# Patient Record
Sex: MALE | Race: WHITE | Employment: FULL TIME | URBAN - METROPOLITAN AREA
[De-identification: names, ages, dates, MRNs, and addresses within clinical notes are randomized per-mention and may not be internally consistent; named-entity substitution may affect disease eponyms.]

---

## 2017-02-24 PROBLEM — E10.65 CONTROLLED TYPE 1 DIABETES MELLITUS WITH HYPERGLYCEMIA (HCC): Status: ACTIVE | Noted: 2017-02-24

## 2018-02-03 ENCOUNTER — HOSPITAL ENCOUNTER (OUTPATIENT)
Facility: HOSPITAL | Age: 43
Setting detail: OBSERVATION
Discharge: HOME | End: 2018-02-04
Attending: EMERGENCY MEDICINE | Admitting: INTERNAL MEDICINE
Payer: COMMERCIAL

## 2018-02-03 ENCOUNTER — APPOINTMENT (OUTPATIENT)
Dept: CT IMAGING | Facility: HOSPITAL | Age: 43
End: 2018-02-03
Attending: EMERGENCY MEDICINE
Payer: COMMERCIAL

## 2018-02-03 DIAGNOSIS — N20.0 RENAL STONE: Primary | ICD-10-CM

## 2018-02-03 LAB
BILIRUB UR QL STRIP.AUTO: NEGATIVE
COLOR UR AUTO: YELLOW
GLUCOSE UR STRIP.AUTO-MCNC: 150 MG/DL
KETONES UR STRIP.AUTO-MCNC: NEGATIVE MG/DL
LEUKOCYTE ESTERASE UR QL STRIP.AUTO: NEGATIVE
NITRITE UR QL STRIP.AUTO: NEGATIVE
PH UR STRIP.AUTO: 6 [PH] (ref 4.5–8)
PROT UR STRIP.AUTO-MCNC: NEGATIVE MG/DL
RBC #/AREA URNS AUTO: >10 /HPF
SP GR UR STRIP.AUTO: 1.02 (ref 1–1.03)
UROBILINOGEN UR STRIP.AUTO-MCNC: <2 MG/DL

## 2018-02-03 PROCEDURE — 74176 CT ABD & PELVIS W/O CONTRAST: CPT | Performed by: EMERGENCY MEDICINE

## 2018-02-03 RX ORDER — MORPHINE SULFATE 2 MG/ML
4 INJECTION, SOLUTION INTRAMUSCULAR; INTRAVENOUS ONCE
Status: COMPLETED | OUTPATIENT
Start: 2018-02-03 | End: 2018-02-03

## 2018-02-03 RX ORDER — ONDANSETRON 2 MG/ML
4 INJECTION INTRAMUSCULAR; INTRAVENOUS ONCE
Status: COMPLETED | OUTPATIENT
Start: 2018-02-03 | End: 2018-02-03

## 2018-02-03 RX ORDER — MORPHINE SULFATE 2 MG/ML
4 INJECTION, SOLUTION INTRAMUSCULAR; INTRAVENOUS EVERY 30 MIN PRN
Status: DISCONTINUED | OUTPATIENT
Start: 2018-02-03 | End: 2018-02-04

## 2018-02-04 ENCOUNTER — SURGERY (OUTPATIENT)
Age: 43
End: 2018-02-04

## 2018-02-04 ENCOUNTER — APPOINTMENT (OUTPATIENT)
Dept: GENERAL RADIOLOGY | Facility: HOSPITAL | Age: 43
End: 2018-02-04
Attending: UROLOGY
Payer: COMMERCIAL

## 2018-02-04 VITALS
TEMPERATURE: 98 F | WEIGHT: 226.69 LBS | HEIGHT: 71 IN | SYSTOLIC BLOOD PRESSURE: 143 MMHG | BODY MASS INDEX: 31.73 KG/M2 | OXYGEN SATURATION: 96 % | RESPIRATION RATE: 20 BRPM | DIASTOLIC BLOOD PRESSURE: 74 MMHG | HEART RATE: 87 BPM

## 2018-02-04 PROBLEM — N20.0 RENAL STONE: Status: ACTIVE | Noted: 2018-02-04

## 2018-02-04 LAB
ALBUMIN SERPL-MCNC: 3.9 G/DL (ref 3.5–4.8)
ALP LIVER SERPL-CCNC: 57 U/L (ref 45–117)
ALT SERPL-CCNC: 29 U/L (ref 17–63)
AST SERPL-CCNC: 13 U/L (ref 15–41)
BASOPHILS # BLD AUTO: 0.01 X10(3) UL (ref 0–0.1)
BASOPHILS # BLD AUTO: 0.02 X10(3) UL (ref 0–0.1)
BASOPHILS NFR BLD AUTO: 0.1 %
BASOPHILS NFR BLD AUTO: 0.3 %
BILIRUB SERPL-MCNC: 0.7 MG/DL (ref 0.1–2)
BILIRUB UR QL STRIP.AUTO: NEGATIVE
BUN BLD-MCNC: 19 MG/DL (ref 8–20)
BUN BLD-MCNC: 21 MG/DL (ref 8–20)
CALCIUM BLD-MCNC: 8.5 MG/DL (ref 8.3–10.3)
CALCIUM BLD-MCNC: 8.9 MG/DL (ref 8.3–10.3)
CHLORIDE: 105 MMOL/L (ref 101–111)
CHLORIDE: 108 MMOL/L (ref 101–111)
CLARITY UR REFRACT.AUTO: CLEAR
CO2: 25 MMOL/L (ref 22–32)
CO2: 27 MMOL/L (ref 22–32)
COLOR UR AUTO: YELLOW
CREAT BLD-MCNC: 1.36 MG/DL (ref 0.7–1.3)
CREAT BLD-MCNC: 1.44 MG/DL (ref 0.7–1.3)
EOSINOPHIL # BLD AUTO: 0.01 X10(3) UL (ref 0–0.3)
EOSINOPHIL # BLD AUTO: 0.05 X10(3) UL (ref 0–0.3)
EOSINOPHIL NFR BLD AUTO: 0.1 %
EOSINOPHIL NFR BLD AUTO: 0.6 %
ERYTHROCYTE [DISTWIDTH] IN BLOOD BY AUTOMATED COUNT: 12.6 % (ref 11.5–16)
ERYTHROCYTE [DISTWIDTH] IN BLOOD BY AUTOMATED COUNT: 12.8 % (ref 11.5–16)
EST. AVERAGE GLUCOSE BLD GHB EST-MCNC: 223 MG/DL (ref 68–126)
GLUCOSE BLD-MCNC: 124 MG/DL (ref 65–99)
GLUCOSE BLD-MCNC: 133 MG/DL (ref 70–99)
GLUCOSE BLD-MCNC: 135 MG/DL (ref 65–99)
GLUCOSE BLD-MCNC: 147 MG/DL (ref 65–99)
GLUCOSE BLD-MCNC: 173 MG/DL (ref 70–99)
GLUCOSE UR STRIP.AUTO-MCNC: 150 MG/DL
HBA1C MFR BLD HPLC: 9.4 % (ref ?–5.7)
HCT VFR BLD AUTO: 36.1 % (ref 37–53)
HCT VFR BLD AUTO: 38.5 % (ref 37–53)
HGB BLD-MCNC: 12.3 G/DL (ref 13–17)
HGB BLD-MCNC: 13 G/DL (ref 13–17)
IMMATURE GRANULOCYTE COUNT: 0.02 X10(3) UL (ref 0–1)
IMMATURE GRANULOCYTE COUNT: 0.03 X10(3) UL (ref 0–1)
IMMATURE GRANULOCYTE RATIO %: 0.3 %
IMMATURE GRANULOCYTE RATIO %: 0.3 %
KETONES UR STRIP.AUTO-MCNC: NEGATIVE MG/DL
LEUKOCYTE ESTERASE UR QL STRIP.AUTO: NEGATIVE
LIPASE: 62 U/L (ref 73–393)
LYMPHOCYTES # BLD AUTO: 0.86 X10(3) UL (ref 0.9–4)
LYMPHOCYTES # BLD AUTO: 1.28 X10(3) UL (ref 0.9–4)
LYMPHOCYTES NFR BLD AUTO: 12.2 %
LYMPHOCYTES NFR BLD AUTO: 14.1 %
M PROTEIN MFR SERPL ELPH: 7.2 G/DL (ref 6.1–8.3)
MCH RBC QN AUTO: 29 PG (ref 27–33.2)
MCH RBC QN AUTO: 29.5 PG (ref 27–33.2)
MCHC RBC AUTO-ENTMCNC: 33.8 G/DL (ref 31–37)
MCHC RBC AUTO-ENTMCNC: 34.1 G/DL (ref 31–37)
MCV RBC AUTO: 85.9 FL (ref 80–99)
MCV RBC AUTO: 86.6 FL (ref 80–99)
MONOCYTES # BLD AUTO: 0.67 X10(3) UL (ref 0.1–0.6)
MONOCYTES # BLD AUTO: 1.08 X10(3) UL (ref 0.1–0.6)
MONOCYTES NFR BLD AUTO: 11.9 %
MONOCYTES NFR BLD AUTO: 9.5 %
NEUTROPHIL ABS PRELIM: 5.47 X10 (3) UL (ref 1.3–6.7)
NEUTROPHIL ABS PRELIM: 6.6 X10 (3) UL (ref 1.3–6.7)
NEUTROPHILS # BLD AUTO: 5.47 X10(3) UL (ref 1.3–6.7)
NEUTROPHILS # BLD AUTO: 6.6 X10(3) UL (ref 1.3–6.7)
NEUTROPHILS NFR BLD AUTO: 73 %
NEUTROPHILS NFR BLD AUTO: 77.6 %
NITRITE UR QL STRIP.AUTO: NEGATIVE
PH UR STRIP.AUTO: 6 [PH] (ref 4.5–8)
PLATELET # BLD AUTO: 167 10(3)UL (ref 150–450)
PLATELET # BLD AUTO: 211 10(3)UL (ref 150–450)
POTASSIUM SERPL-SCNC: 3.8 MMOL/L (ref 3.6–5.1)
POTASSIUM SERPL-SCNC: 3.8 MMOL/L (ref 3.6–5.1)
PROT UR STRIP.AUTO-MCNC: NEGATIVE MG/DL
RBC # BLD AUTO: 4.17 X10(6)UL (ref 4.3–5.7)
RBC # BLD AUTO: 4.48 X10(6)UL (ref 4.3–5.7)
RBC #/AREA URNS AUTO: >10 /HPF
RED CELL DISTRIBUTION WIDTH-SD: 39.7 FL (ref 35.1–46.3)
RED CELL DISTRIBUTION WIDTH-SD: 40.1 FL (ref 35.1–46.3)
SODIUM SERPL-SCNC: 139 MMOL/L (ref 136–144)
SODIUM SERPL-SCNC: 140 MMOL/L (ref 136–144)
SP GR UR STRIP.AUTO: 1.02 (ref 1–1.03)
UROBILINOGEN UR STRIP.AUTO-MCNC: <2 MG/DL
WBC # BLD AUTO: 7.1 X10(3) UL (ref 4–13)
WBC # BLD AUTO: 9.1 X10(3) UL (ref 4–13)

## 2018-02-04 PROCEDURE — 74420 UROGRAPHY RTRGR +-KUB: CPT | Performed by: UROLOGY

## 2018-02-04 PROCEDURE — 99219 INITIAL OBSERVATION CARE,LEVL II: CPT | Performed by: INTERNAL MEDICINE

## 2018-02-04 PROCEDURE — 0T768DZ DILATION OF RIGHT URETER WITH INTRALUMINAL DEVICE, VIA NATURAL OR ARTIFICIAL OPENING ENDOSCOPIC: ICD-10-PCS | Performed by: UROLOGY

## 2018-02-04 PROCEDURE — 0TF68ZZ FRAGMENTATION IN RIGHT URETER, VIA NATURAL OR ARTIFICIAL OPENING ENDOSCOPIC: ICD-10-PCS | Performed by: UROLOGY

## 2018-02-04 DEVICE — INLAY STENT 6F 28CM: Type: IMPLANTABLE DEVICE | Site: URETER | Status: FUNCTIONAL

## 2018-02-04 RX ORDER — DEXTROSE MONOHYDRATE 25 G/50ML
50 INJECTION, SOLUTION INTRAVENOUS
Status: DISCONTINUED | OUTPATIENT
Start: 2018-02-04 | End: 2018-02-04 | Stop reason: HOSPADM

## 2018-02-04 RX ORDER — PHENAZOPYRIDINE HYDROCHLORIDE 200 MG/1
200 TABLET, FILM COATED ORAL
Status: DISCONTINUED | OUTPATIENT
Start: 2018-02-04 | End: 2018-02-04

## 2018-02-04 RX ORDER — NALOXONE HYDROCHLORIDE 0.4 MG/ML
80 INJECTION, SOLUTION INTRAMUSCULAR; INTRAVENOUS; SUBCUTANEOUS AS NEEDED
Status: DISCONTINUED | OUTPATIENT
Start: 2018-02-04 | End: 2018-02-04 | Stop reason: HOSPADM

## 2018-02-04 RX ORDER — ONDANSETRON 2 MG/ML
4 INJECTION INTRAMUSCULAR; INTRAVENOUS ONCE
Status: COMPLETED | OUTPATIENT
Start: 2018-02-04 | End: 2018-02-04

## 2018-02-04 RX ORDER — HYDROCODONE BITARTRATE AND ACETAMINOPHEN 5; 325 MG/1; MG/1
1 TABLET ORAL AS NEEDED
Status: DISCONTINUED | OUTPATIENT
Start: 2018-02-04 | End: 2018-02-04 | Stop reason: HOSPADM

## 2018-02-04 RX ORDER — ROSUVASTATIN CALCIUM 20 MG/1
10 TABLET, COATED ORAL NIGHTLY
Status: DISCONTINUED | OUTPATIENT
Start: 2018-02-04 | End: 2018-02-04

## 2018-02-04 RX ORDER — FENOFIBRATE 134 MG/1
134 CAPSULE ORAL
Status: DISCONTINUED | OUTPATIENT
Start: 2018-02-04 | End: 2018-02-04

## 2018-02-04 RX ORDER — KETOROLAC TROMETHAMINE 30 MG/ML
30 INJECTION, SOLUTION INTRAMUSCULAR; INTRAVENOUS ONCE
Status: COMPLETED | OUTPATIENT
Start: 2018-02-04 | End: 2018-02-04

## 2018-02-04 RX ORDER — MORPHINE SULFATE 2 MG/ML
4 INJECTION, SOLUTION INTRAMUSCULAR; INTRAVENOUS EVERY 2 HOUR PRN
Status: DISCONTINUED | OUTPATIENT
Start: 2018-02-04 | End: 2018-02-04

## 2018-02-04 RX ORDER — ACETAMINOPHEN 500 MG
1000 TABLET ORAL ONCE AS NEEDED
Status: DISCONTINUED | OUTPATIENT
Start: 2018-02-04 | End: 2018-02-04 | Stop reason: HOSPADM

## 2018-02-04 RX ORDER — ONDANSETRON 2 MG/ML
4 INJECTION INTRAMUSCULAR; INTRAVENOUS AS NEEDED
Status: DISCONTINUED | OUTPATIENT
Start: 2018-02-04 | End: 2018-02-04 | Stop reason: HOSPADM

## 2018-02-04 RX ORDER — SODIUM CHLORIDE 9 MG/ML
INJECTION, SOLUTION INTRAVENOUS CONTINUOUS
Status: ACTIVE | OUTPATIENT
Start: 2018-02-04 | End: 2018-02-04

## 2018-02-04 RX ORDER — ACETAMINOPHEN 325 MG/1
650 TABLET ORAL EVERY 6 HOURS PRN
Status: DISCONTINUED | OUTPATIENT
Start: 2018-02-04 | End: 2018-02-04

## 2018-02-04 RX ORDER — HEPARIN SODIUM 5000 [USP'U]/ML
5000 INJECTION, SOLUTION INTRAVENOUS; SUBCUTANEOUS EVERY 8 HOURS SCHEDULED
Status: DISCONTINUED | OUTPATIENT
Start: 2018-02-04 | End: 2018-02-04

## 2018-02-04 RX ORDER — PHENAZOPYRIDINE HYDROCHLORIDE 100 MG/1
100 TABLET, FILM COATED ORAL 3 TIMES DAILY PRN
Qty: 15 TABLET | Refills: 1 | Status: SHIPPED | OUTPATIENT
Start: 2018-02-04 | End: 2018-02-14

## 2018-02-04 RX ORDER — DEXTROSE MONOHYDRATE 25 G/50ML
50 INJECTION, SOLUTION INTRAVENOUS
Status: DISCONTINUED | OUTPATIENT
Start: 2018-02-04 | End: 2018-02-04

## 2018-02-04 RX ORDER — KETOROLAC TROMETHAMINE 30 MG/ML
INJECTION, SOLUTION INTRAMUSCULAR; INTRAVENOUS
Status: DISPENSED
Start: 2018-02-04 | End: 2018-02-04

## 2018-02-04 RX ORDER — HYDROCODONE BITARTRATE AND ACETAMINOPHEN 5; 325 MG/1; MG/1
2 TABLET ORAL AS NEEDED
Status: DISCONTINUED | OUTPATIENT
Start: 2018-02-04 | End: 2018-02-04 | Stop reason: HOSPADM

## 2018-02-04 RX ORDER — OXYBUTYNIN CHLORIDE 10 MG/1
10 TABLET, EXTENDED RELEASE ORAL DAILY PRN
Qty: 21 TABLET | Refills: 0 | Status: SHIPPED | OUTPATIENT
Start: 2018-02-04 | End: 2018-02-23

## 2018-02-04 RX ORDER — MEPERIDINE HYDROCHLORIDE 25 MG/ML
INJECTION INTRAMUSCULAR; INTRAVENOUS; SUBCUTANEOUS
Status: COMPLETED
Start: 2018-02-04 | End: 2018-02-04

## 2018-02-04 RX ORDER — MORPHINE SULFATE 2 MG/ML
1 INJECTION, SOLUTION INTRAMUSCULAR; INTRAVENOUS EVERY 2 HOUR PRN
Status: DISCONTINUED | OUTPATIENT
Start: 2018-02-04 | End: 2018-02-04

## 2018-02-04 RX ORDER — SODIUM CHLORIDE, SODIUM LACTATE, POTASSIUM CHLORIDE, CALCIUM CHLORIDE 600; 310; 30; 20 MG/100ML; MG/100ML; MG/100ML; MG/100ML
INJECTION, SOLUTION INTRAVENOUS CONTINUOUS
Status: DISCONTINUED | OUTPATIENT
Start: 2018-02-04 | End: 2018-02-04 | Stop reason: HOSPADM

## 2018-02-04 RX ORDER — SODIUM CHLORIDE 9 MG/ML
INJECTION, SOLUTION INTRAVENOUS CONTINUOUS
Status: DISCONTINUED | OUTPATIENT
Start: 2018-02-04 | End: 2018-02-04

## 2018-02-04 RX ORDER — MEPERIDINE HYDROCHLORIDE 25 MG/ML
12.5 INJECTION INTRAMUSCULAR; INTRAVENOUS; SUBCUTANEOUS ONCE
Status: COMPLETED | OUTPATIENT
Start: 2018-02-04 | End: 2018-02-04

## 2018-02-04 RX ORDER — HYDROCODONE BITARTRATE AND ACETAMINOPHEN 5; 325 MG/1; MG/1
1 TABLET ORAL EVERY 4 HOURS PRN
Qty: 25 TABLET | Refills: 0 | Status: SHIPPED | OUTPATIENT
Start: 2018-02-04 | End: 2018-02-14

## 2018-02-04 RX ORDER — MORPHINE SULFATE 2 MG/ML
2 INJECTION, SOLUTION INTRAMUSCULAR; INTRAVENOUS EVERY 5 MIN PRN
Status: DISCONTINUED | OUTPATIENT
Start: 2018-02-04 | End: 2018-02-04 | Stop reason: HOSPADM

## 2018-02-04 RX ORDER — ONDANSETRON 2 MG/ML
4 INJECTION INTRAMUSCULAR; INTRAVENOUS EVERY 6 HOURS PRN
Status: DISCONTINUED | OUTPATIENT
Start: 2018-02-04 | End: 2018-02-04

## 2018-02-04 RX ORDER — CEFAZOLIN SODIUM 1 G/3ML
INJECTION, POWDER, FOR SOLUTION INTRAMUSCULAR; INTRAVENOUS
Status: DISCONTINUED | OUTPATIENT
Start: 2018-02-04 | End: 2018-02-04 | Stop reason: HOSPADM

## 2018-02-04 RX ORDER — HYDROCODONE BITARTRATE AND ACETAMINOPHEN 5; 325 MG/1; MG/1
1 TABLET ORAL EVERY 6 HOURS PRN
Status: DISCONTINUED | OUTPATIENT
Start: 2018-02-04 | End: 2018-02-04

## 2018-02-04 RX ORDER — ALFUZOSIN HYDROCHLORIDE 10 MG/1
10 TABLET, EXTENDED RELEASE ORAL
Status: DISCONTINUED | OUTPATIENT
Start: 2018-02-04 | End: 2018-02-04

## 2018-02-04 RX ORDER — ONDANSETRON 2 MG/ML
INJECTION INTRAMUSCULAR; INTRAVENOUS
Status: DISPENSED
Start: 2018-02-04 | End: 2018-02-04

## 2018-02-04 RX ORDER — MORPHINE SULFATE 2 MG/ML
2 INJECTION, SOLUTION INTRAMUSCULAR; INTRAVENOUS EVERY 2 HOUR PRN
Status: DISCONTINUED | OUTPATIENT
Start: 2018-02-04 | End: 2018-02-04

## 2018-02-04 NOTE — PLAN OF CARE
Diabetes/Glucose Control    • Glucose maintained within prescribed range Progressing        Pt. On Insulin drip as at home. Dr. Neymar Sparrow in to see pt this am for Insulin pump management. Contract reviewed w/ pt and signed.      GENITOURINARY - ADULT    • Abse

## 2018-02-04 NOTE — RESPIRATORY THERAPY NOTE
MELVI : EQUIPMENT USE: DAILY SUMMARY                                            SET MODE: AUTO CPAP WITH CFLEX                                          USAGE IN HOURS: 5:38                                          90

## 2018-02-04 NOTE — CONSULTS
[facility] Urology  Consultation Note    Velasquez Molina Patient Status:  Observation    1975 MRN [id number]   [facility] 4NW-A Attending Lavern Jovan [address] Day # 0 PCP [address]     Reason for Consultation:  R Problem Relation Age of Onset   • Cancer Mother      polpys before age 27   • Cancer Maternal Grandfather      colon cancer   • Cancer Paternal Grandfather      colon cancer   • Diabetes Father      Type 2 DM      reports that he quit smoking about 18 ye organomegally, no tenderness, normal bowel sounds  NEURO: Alert and Oriented, motor and sensory grossly non-focal   SKIN: No rashes, no discoloration  EXTREMITIES: No edema or cyanosis, no calf pain    Laboratory Data:    Lab Results  Component Value Date

## 2018-02-04 NOTE — OPERATIVE REPORT
[address] [facility] Patient Status:  Observation    1975 MRN [id number]   [facility] SURGERY Attending [facility] Day # 0 PCP Silvia Genaouth: 2018    SURG manner. There was dilation of ureter and kidney calyces observed proximal to the right ureter filling defect, most likely signifying the stone.   A 0.038 guide wire was then advanced via the open end catheter into the ureter and up to the renal collecting

## 2018-02-04 NOTE — CONSULTS
[facility]  Report of Consultation    Katelin Vidales Patient Status:  Observation    1975 MRN [id number]   [facility] 4NW-A Attending Kalyn Murphy [address] Day # 0 PCP [address]     Reason for Consultation:  Type 1 di complication, uncontrolled     Dx at age 40 in 2/2013   • Visual impairment     Blurry vision     Past Surgical History:  2002: COLONOSCOPY      Comment: negative  No date: OTHER SURGICAL HISTORY      Comment: DST repair  3/6/14: OTHER SURGICAL HISTORY Exam:  Wt Readings from Last 6 Encounters:  02/04/18 : 226 lb 11.2 oz (102.8 kg)  06/26/17 : 244 lb 3.2 oz (110.8 kg)  02/24/17 : 243 lb 8 oz (110.5 kg)  11/18/16 : 242 lb 11.2 oz (110.1 kg)  07/13/16 : 242 lb 8 oz (110 kg)  03/03/16 : 238 lb 8 oz (108.2 k 43year old male here for      1. Type 1 diabetes, uncontrolled with hyperglycemia.   Insulin pump in place.   - will use insulin pump during hospitalization  - risk for hyperglycemia due to stress   - risk for hypoglycemia while NPO  - adjusted to temp b

## 2018-02-04 NOTE — PLAN OF CARE
NURSING ADMISSION NOTE      Patient admitted via cart. Oriented to room. Safety precautions initiated. Bed in low position. Call light in reach. Admitted patient from ED due to right flank pain/ kidney stone.  Received admitting orders from Dr. Judy Freed

## 2018-02-04 NOTE — H&P
[facility]  History and Physical     Diogo Bauer Patient Status:  Emergency    1975 MRN [id number]   Location [address] Attending Will, Lenoard Leyden, MD   Hosp Day # 0 PCP [address]     Chief Complaint: Alize Chin Paternal Grandfather      colon cancer   • Diabetes Father      Type 2 DM       Allergies:   Mold                    Runny nose  Pollen                  Runny nose    Medications:    No current facility-administered medications on file prior to encounter. 4 times per day before meals and bedtime Disp:  Rfl:    Blood Glucose Monitoring Suppl (ACCU-CHEK NICOLE SMARTVIEW) W/DEVICE Does not apply Kit  Disp: 1 kit Rfl: 0   Multiple Vitamins-Minerals (MULTIVITAL-M OR) Take 1 tablet by mouth daily.  Disp:  Rfl:    Bl input(s): TROP, CK in the last 72 hours. Imaging: Imaging data reviewed in Epic. ASSESSMENT / PLAN:     1. Renal Stone/Colic  1. Continue IVF  2. Pain control  3. Urology to eval in AM  4. NPO  5. Uroxatral  6. Check UA reflex culture  2.  Acute Colin

## 2018-02-04 NOTE — ED PROVIDER NOTES
Patient Seen in: [facility] Emergency Department    History   Patient presents with:  Abdomen/Flank Pain (GI/)    Stated Complaint: rt flank pain hx of stones    HPI    Patient is a 70-year-old male multiple past medical problems listed below, inc date: 12/1/1999  Smokeless tobacco: Never Used                      Alcohol use: Yes              Comment: 2 wine and 1 beer a week      Review of Systems    Positive for stated complaint: rt flank pain hx of stones  Other systems are as noted in HPI.   Con (*)     RBC URINE >10 (*)     Mucous Urine 1+ (*)     All other components within normal limits   COMP METABOLIC PANEL (14) - Abnormal; Notable for the following:     Glucose 173 (*)     BUN 21 (*)     Creatinine 1.36 (*)     AST 13 (*)     All other compo techniques were used. Dose information is transmitted to the  [facility]) NRDR ([address]) which includes the Dose Index Registry.   PATIENT STATED HISTORY: (As transcribed by Technologist)  Patient has right fl will need to be admitted for pain control and urology consultation. I discussed the patient with Dr. Brian Glynn and Dr. Ernesto Samuel of urology, and he was admitted in a stable condition.       Admission disposition: 2/4/2018 12:40 AM           Disposition a

## 2018-02-28 RX ORDER — OXYBUTYNIN CHLORIDE 10 MG/1
10 TABLET, EXTENDED RELEASE ORAL DAILY PRN
Qty: 7 TABLET | Refills: 0 | Status: SHIPPED | OUTPATIENT
Start: 2018-02-28 | End: 2018-03-07

## 2018-02-28 NOTE — TELEPHONE ENCOUNTER
From: Georgiacheko Maria  Sent: 2/23/2018 1:11 PM CST  Subject: Medication Renewal Request    Hernandez Sun would like a refill of the following medications:     Oxybutynin Chloride ER (DITROPAN XL) 10 MG Oral Tablet 24 Hr [Igor Hernandez MD]   Patient Comment:

## 2018-03-06 PROBLEM — E78.5 HYPERLIPIDEMIA LDL GOAL <100: Status: ACTIVE | Noted: 2018-03-06

## 2018-03-06 PROBLEM — IMO0001 UNCONTROLLED TYPE 1 DIABETES MELLITUS WITHOUT COMPLICATION: Status: ACTIVE | Noted: 2018-03-06

## 2018-03-06 PROCEDURE — 82043 UR ALBUMIN QUANTITATIVE: CPT | Performed by: INTERNAL MEDICINE

## 2018-03-06 PROCEDURE — 82570 ASSAY OF URINE CREATININE: CPT | Performed by: INTERNAL MEDICINE

## 2018-04-09 PROCEDURE — 82507 ASSAY OF CITRATE: CPT | Performed by: UROLOGY

## 2018-04-09 PROCEDURE — 84392 ASSAY OF URINE SULFATE: CPT | Performed by: UROLOGY

## 2018-04-09 PROCEDURE — 83945 ASSAY OF OXALATE: CPT | Performed by: UROLOGY

## 2018-04-09 PROCEDURE — 82340 ASSAY OF CALCIUM IN URINE: CPT | Performed by: UROLOGY

## 2018-04-09 PROCEDURE — 82436 ASSAY OF URINE CHLORIDE: CPT | Performed by: UROLOGY

## 2018-06-04 PROBLEM — E10.65 UNCONTROLLED TYPE 1 DIABETES MELLITUS WITH HYPERGLYCEMIA (HCC): Status: ACTIVE | Noted: 2018-03-06

## 2018-06-04 PROBLEM — IMO0001 UNCONTROLLED TYPE 1 DIABETES MELLITUS WITHOUT COMPLICATION: Status: RESOLVED | Noted: 2018-03-06 | Resolved: 2018-06-04

## 2018-06-04 PROBLEM — E10.65 CONTROLLED TYPE 1 DIABETES MELLITUS WITH HYPERGLYCEMIA (HCC): Status: RESOLVED | Noted: 2017-02-24 | Resolved: 2018-06-04

## 2019-03-06 ENCOUNTER — HOSPITAL ENCOUNTER (EMERGENCY)
Facility: HOSPITAL | Age: 44
Discharge: HOME | End: 2019-03-06
Attending: EMERGENCY MEDICINE
Payer: COMMERCIAL

## 2019-03-06 ENCOUNTER — APPOINTMENT (OUTPATIENT)
Dept: GENERAL RADIOLOGY | Facility: HOSPITAL | Age: 44
End: 2019-03-06
Attending: EMERGENCY MEDICINE
Payer: COMMERCIAL

## 2019-03-06 ENCOUNTER — APPOINTMENT (OUTPATIENT)
Dept: CV DIAGNOSTICS | Facility: HOSPITAL | Age: 44
End: 2019-03-06
Attending: PHYSICIAN ASSISTANT
Payer: COMMERCIAL

## 2019-03-06 VITALS
SYSTOLIC BLOOD PRESSURE: 121 MMHG | WEIGHT: 239 LBS | HEIGHT: 71 IN | DIASTOLIC BLOOD PRESSURE: 82 MMHG | OXYGEN SATURATION: 97 % | RESPIRATION RATE: 18 BRPM | HEART RATE: 70 BPM | BODY MASS INDEX: 33.46 KG/M2 | TEMPERATURE: 99 F

## 2019-03-06 DIAGNOSIS — R07.89 CHEST TIGHTNESS OR PRESSURE: Primary | ICD-10-CM

## 2019-03-06 LAB
ALBUMIN SERPL-MCNC: 4 G/DL (ref 3.4–5)
ALBUMIN/GLOB SERPL: 1.1 {RATIO} (ref 1–2)
ALP LIVER SERPL-CCNC: 66 U/L (ref 45–117)
ALT SERPL-CCNC: 32 U/L (ref 16–61)
ANION GAP SERPL CALC-SCNC: 10 MMOL/L (ref 0–18)
AST SERPL-CCNC: 22 U/L (ref 15–37)
ATRIAL RATE: 77 BPM
BASOPHILS # BLD AUTO: 0.05 X10(3) UL (ref 0–0.2)
BASOPHILS NFR BLD AUTO: 0.8 %
BILIRUB SERPL-MCNC: 0.8 MG/DL (ref 0.1–2)
BUN BLD-MCNC: 15 MG/DL (ref 7–18)
BUN/CREAT SERPL: 12.6 (ref 10–20)
CALCIUM BLD-MCNC: 9.1 MG/DL (ref 8.5–10.1)
CHLORIDE SERPL-SCNC: 106 MMOL/L (ref 98–107)
CO2 SERPL-SCNC: 24 MMOL/L (ref 21–32)
CREAT BLD-MCNC: 1.19 MG/DL (ref 0.7–1.3)
DEPRECATED RDW RBC AUTO: 39.8 FL (ref 35.1–46.3)
EOSINOPHIL # BLD AUTO: 0.15 X10(3) UL (ref 0–0.7)
EOSINOPHIL NFR BLD AUTO: 2.5 %
ERYTHROCYTE [DISTWIDTH] IN BLOOD BY AUTOMATED COUNT: 12.9 % (ref 11–15)
GLOBULIN PLAS-MCNC: 3.7 G/DL (ref 2.8–4.4)
GLUCOSE BLD-MCNC: 137 MG/DL (ref 70–99)
HCT VFR BLD AUTO: 43 % (ref 39–53)
HGB BLD-MCNC: 14.9 G/DL (ref 13–17.5)
IMM GRANULOCYTES # BLD AUTO: 0.05 X10(3) UL (ref 0–1)
IMM GRANULOCYTES NFR BLD: 0.8 %
LYMPHOCYTES # BLD AUTO: 1.89 X10(3) UL (ref 1–4)
LYMPHOCYTES NFR BLD AUTO: 31.7 %
M PROTEIN MFR SERPL ELPH: 7.7 G/DL (ref 6.4–8.2)
MCH RBC QN AUTO: 29.6 PG (ref 26–34)
MCHC RBC AUTO-ENTMCNC: 34.7 G/DL (ref 31–37)
MCV RBC AUTO: 85.3 FL (ref 80–100)
MONOCYTES # BLD AUTO: 0.57 X10(3) UL (ref 0.1–1)
MONOCYTES NFR BLD AUTO: 9.5 %
NEUTROPHILS # BLD AUTO: 3.26 X10 (3) UL (ref 1.5–7.7)
NEUTROPHILS # BLD AUTO: 3.26 X10(3) UL (ref 1.5–7.7)
NEUTROPHILS NFR BLD AUTO: 54.7 %
OSMOLALITY SERPL CALC.SUM OF ELEC: 293 MOSM/KG (ref 275–295)
P AXIS: 37 DEGREES
P-R INTERVAL: 140 MS
PLATELET # BLD AUTO: 248 10(3)UL (ref 150–450)
POTASSIUM SERPL-SCNC: 4 MMOL/L (ref 3.5–5.1)
Q-T INTERVAL: 368 MS
QRS DURATION: 104 MS
QTC CALCULATION (BEZET): 416 MS
R AXIS: 24 DEGREES
RBC # BLD AUTO: 5.04 X10(6)UL (ref 4.3–5.7)
SODIUM SERPL-SCNC: 140 MMOL/L (ref 136–145)
T AXIS: 39 DEGREES
TROPONIN I SERPL-MCNC: <0.045 NG/ML (ref ?–0.04)
VENTRICULAR RATE: 77 BPM
WBC # BLD AUTO: 6 X10(3) UL (ref 4–11)

## 2019-03-06 PROCEDURE — 96374 THER/PROPH/DIAG INJ IV PUSH: CPT

## 2019-03-06 PROCEDURE — 85025 COMPLETE CBC W/AUTO DIFF WBC: CPT

## 2019-03-06 PROCEDURE — 93350 STRESS TTE ONLY: CPT | Performed by: PHYSICIAN ASSISTANT

## 2019-03-06 PROCEDURE — 93018 CV STRESS TEST I&R ONLY: CPT | Performed by: PHYSICIAN ASSISTANT

## 2019-03-06 PROCEDURE — 71045 X-RAY EXAM CHEST 1 VIEW: CPT | Performed by: EMERGENCY MEDICINE

## 2019-03-06 PROCEDURE — 99285 EMERGENCY DEPT VISIT HI MDM: CPT

## 2019-03-06 PROCEDURE — 93005 ELECTROCARDIOGRAM TRACING: CPT

## 2019-03-06 PROCEDURE — 80053 COMPREHEN METABOLIC PANEL: CPT

## 2019-03-06 PROCEDURE — 85025 COMPLETE CBC W/AUTO DIFF WBC: CPT | Performed by: EMERGENCY MEDICINE

## 2019-03-06 PROCEDURE — 84484 ASSAY OF TROPONIN QUANT: CPT

## 2019-03-06 PROCEDURE — 84484 ASSAY OF TROPONIN QUANT: CPT | Performed by: EMERGENCY MEDICINE

## 2019-03-06 PROCEDURE — 93010 ELECTROCARDIOGRAM REPORT: CPT

## 2019-03-06 PROCEDURE — 80053 COMPREHEN METABOLIC PANEL: CPT | Performed by: EMERGENCY MEDICINE

## 2019-03-06 PROCEDURE — 93017 CV STRESS TEST TRACING ONLY: CPT | Performed by: PHYSICIAN ASSISTANT

## 2019-03-06 RX ORDER — LORAZEPAM 2 MG/ML
0.5 INJECTION INTRAMUSCULAR ONCE
Status: COMPLETED | OUTPATIENT
Start: 2019-03-06 | End: 2019-03-06

## 2019-03-06 RX ORDER — ASPIRIN 81 MG/1
324 TABLET, CHEWABLE ORAL ONCE
Status: COMPLETED | OUTPATIENT
Start: 2019-03-06 | End: 2019-03-06

## 2019-03-06 NOTE — ED INITIAL ASSESSMENT (HPI)
Pt to ED with left sided CP since this morning that went away and returned approximately 2 hours PTA. Pt states the pain does radiate into his left arm; he feels slightly short of breath and clammy. Denies cardiac hx.

## 2019-03-06 NOTE — ED PROVIDER NOTES
Patient Seen in: [facility] Emergency Department    History   Patient presents with:  Chest Pain Angina (cardiovascular)    Stated Complaint: chest pain    HPI    CHIEF COMPLAINT: Chest pressure, shortness of breath    HISTORY OF PRESENT ILLNESS: Pa noncontributory to the presenting problem, except as indicated as above.     Past Medical History:   Diagnosis Date   • Hyperlipidemia LDL goal < 100    • Hypertriglyceridemia     TG levels range from 400-700s   • Incomplete RBBB     Dx in 2013   • Nephroli 180.3 cm (5' 11\")   Wt 108.4 kg   SpO2 97%   BMI 33.33 kg/m²         Physical Exam    Nursing notes and vital signs reviewed     General Appearance: alert and oriented x 4, no acute distress  Eyes:  pupils equal and round no pallor or injection  Throat: T labs drawn. Patient CBC, CMP and troponin were all unremarkable. Xr Chest Ap Portable  (cpt=71045)    Result Date: 3/6/2019  PROCEDURE:  XR CHEST AP PORTABLE  (CPT=71045)  TECHNIQUE:  AP chest radiograph was obtained.   COMPARISON:  MARITZA ANDERSON concerns. Patient states they understand diagnosis, followup plan and agree with and understand  discharge instructions and plan. I answered all of the patient's questions prior to discharge. Patient felt comfortable going home.     Disposition and Plan

## 2019-03-06 NOTE — ED PROVIDER NOTES
12-year-old male was seen by me as well as by the ALBERT with complaints of some chest pain in the substernal region of his chest and occasionally going down his left arm.   This is been occurring intermittently but was worse today he has had episodes that hav

## 2019-03-08 ENCOUNTER — HOSPITAL ENCOUNTER (OUTPATIENT)
Dept: CT IMAGING | Age: 44
Discharge: HOME | End: 2019-03-08
Attending: PHYSICIAN ASSISTANT
Payer: COMMERCIAL

## 2019-03-08 DIAGNOSIS — I72.9 ANEURYSM (HCC): ICD-10-CM

## 2019-03-08 DIAGNOSIS — I26.99 PULMONARY EMBOLI (HCC): ICD-10-CM

## 2019-03-08 PROCEDURE — 71275 CT ANGIOGRAPHY CHEST: CPT | Performed by: PHYSICIAN ASSISTANT

## 2019-03-26 ENCOUNTER — OFFICE VISIT (OUTPATIENT)
Dept: CARDIOLOGY | Age: 44
End: 2019-03-26

## 2019-03-26 VITALS
HEIGHT: 71 IN | BODY MASS INDEX: 32.2 KG/M2 | HEART RATE: 64 BPM | DIASTOLIC BLOOD PRESSURE: 70 MMHG | WEIGHT: 230 LBS | SYSTOLIC BLOOD PRESSURE: 110 MMHG

## 2019-03-26 DIAGNOSIS — E78.5 DYSLIPIDEMIA: Primary | ICD-10-CM

## 2019-03-26 DIAGNOSIS — R07.9 CHEST PAIN, UNSPECIFIED TYPE: ICD-10-CM

## 2019-03-26 DIAGNOSIS — E10.65 UNCONTROLLED TYPE 1 DIABETES MELLITUS WITH HYPERGLYCEMIA (CMD): ICD-10-CM

## 2019-03-26 DIAGNOSIS — R06.09 OTHER FORM OF DYSPNEA: ICD-10-CM

## 2019-03-26 PROBLEM — E78.1 HYPERTRIGLYCERIDEMIA: Status: ACTIVE | Noted: 2019-03-26

## 2019-03-26 PROBLEM — R06.00 DYSPNEA: Status: ACTIVE | Noted: 2019-03-26

## 2019-03-26 PROCEDURE — 3074F SYST BP LT 130 MM HG: CPT | Performed by: INTERNAL MEDICINE

## 2019-03-26 PROCEDURE — 99205 OFFICE O/P NEW HI 60 MIN: CPT | Performed by: INTERNAL MEDICINE

## 2019-03-26 PROCEDURE — 3078F DIAST BP <80 MM HG: CPT | Performed by: INTERNAL MEDICINE

## 2019-03-26 RX ORDER — ROSUVASTATIN CALCIUM 10 MG/1
10 TABLET, COATED ORAL
COMMUNITY

## 2019-03-26 RX ORDER — INSULIN ASPART 100 [IU]/ML
INJECTION, SOLUTION INTRAVENOUS; SUBCUTANEOUS
COMMUNITY
Start: 2018-03-20

## 2019-03-26 RX ORDER — CHLORTHALIDONE 25 MG/1
25 TABLET ORAL
COMMUNITY
Start: 2018-04-25 | End: 2019-03-26

## 2019-03-26 RX ORDER — LORAZEPAM 0.5 MG/1
TABLET ORAL
Refills: 0 | COMMUNITY
Start: 2019-03-09

## 2019-03-26 RX ORDER — FENOFIBRATE 145 MG/1
TABLET, COATED ORAL
COMMUNITY
Start: 2018-04-10

## 2019-03-26 RX ORDER — METFORMIN HYDROCHLORIDE 500 MG/1
TABLET, EXTENDED RELEASE ORAL
COMMUNITY
Start: 2017-02-24 | End: 2019-03-26

## 2019-03-26 ASSESSMENT — ENCOUNTER SYMPTOMS
FEVER: 0
HEMATOCHEZIA: 0
HEMOPTYSIS: 0
WEIGHT GAIN: 0
CHILLS: 0
BRUISES/BLEEDS EASILY: 0
SUSPICIOUS LESIONS: 0
COUGH: 0
WEIGHT LOSS: 0
ALLERGIC/IMMUNOLOGIC COMMENTS: NO NEW FOOD ALLERGIES

## 2020-02-07 ENCOUNTER — TELEPHONE (OUTPATIENT)
Dept: ENDOCRINOLOGY CLINIC | Facility: CLINIC | Age: 45
End: 2020-02-07

## 2020-02-07 NOTE — TELEPHONE ENCOUNTER
LMTCB to schedule appt - patient referred from Dr. Fay Blankenship former practice for diabetes management. LM on 1/21/2020 and today.

## 2020-03-24 ENCOUNTER — APPOINTMENT (OUTPATIENT)
Dept: CARDIOLOGY | Age: 45
End: 2020-03-24

## 2020-05-18 ENCOUNTER — TELEPHONE (OUTPATIENT)
Dept: ENDOCRINOLOGY CLINIC | Facility: CLINIC | Age: 45
End: 2020-05-18

## 2020-05-18 ENCOUNTER — TELEMEDICINE (OUTPATIENT)
Dept: ENDOCRINOLOGY CLINIC | Facility: CLINIC | Age: 45
End: 2020-05-18
Payer: COMMERCIAL

## 2020-05-18 DIAGNOSIS — Z96.41 INSULIN PUMP IN PLACE: ICD-10-CM

## 2020-05-18 DIAGNOSIS — E10.65 UNCONTROLLED TYPE 1 DIABETES MELLITUS WITH HYPERGLYCEMIA (HCC): Primary | ICD-10-CM

## 2020-05-18 PROBLEM — N20.0 RENAL STONE: Status: RESOLVED | Noted: 2018-02-04 | Resolved: 2020-05-18

## 2020-05-18 PROCEDURE — 99214 OFFICE O/P EST MOD 30 MIN: CPT | Performed by: NURSE PRACTITIONER

## 2020-05-18 PROCEDURE — 95251 CONT GLUC MNTR ANALYSIS I&R: CPT | Performed by: NURSE PRACTITIONER

## 2020-05-18 RX ORDER — INSULIN ASPART 100 [IU]/ML
INJECTION, SOLUTION INTRAVENOUS; SUBCUTANEOUS
Qty: 40 ML | Refills: 1 | Status: SHIPPED | OUTPATIENT
Start: 2020-05-18 | End: 2021-03-01

## 2020-05-18 RX ORDER — ERGOCALCIFEROL 1.25 MG/1
50000 CAPSULE ORAL WEEKLY
Qty: 12 CAPSULE | Refills: 0 | Status: SHIPPED | OUTPATIENT
Start: 2020-05-18 | End: 2020-06-17

## 2020-05-18 NOTE — PROGRESS NOTES
Due to COVID-19 ACTION PLAN, the patient's office visit was converted to a phone or video visit. Time Spent:  40 min      Batsheva Stephen is a 40year old male who presents for virtual visit to establish for type 1 diabetes management.    Primary care physic stones; seeing nephrology/urology. Taking Chlorathalidone rx. No side effects.      Previous DM therapies:  Happy pump   Jardiance: 2015: lack of effect   Metformin ER, IR: severe GI   Glimepiride: lack of control   Dexcom: changed to [address] • Obesity (BMI 30-39. 9)     BMI 30   • MELVI on CPAP    • Type II or unspecified type diabetes mellitus without mention of complication, uncontrolled     Dx at age 40 in 2/2013   • Visual impairment     Blurry vision     Past Surgical History:   Procedure capsule 0   • Dapagliflozin Propanediol (FARXIGA) 5 MG Oral Tab 1 tab daily 30 tablet 3   • Fenofibrate 145 MG Oral Tab TK 1 T PO QD  1   • NOVOLOG 100 UNIT/ML Subcutaneous Solution USE VIA INSULIN PUMP  UNITS PER  mL 1   • Glucose Blood (ONE Insulin antibodies were positive but may be skewed due to being on insulin therapy already -   Will cautiously start other agents to improve DM control and see if we can back off insulin   Farxiga 5mg once daily     Discussed the risk of and benefits of understanding of these issues and agrees to the plan. Orders Placed This Encounter      Glucagon (GVOKE PFS) 1 MG/0.2ML Subcutaneous Solution Prefilled Syringe          Sig: Inject 1 mg into the skin as needed.           Dispense:  1 Syringe          Ref Bonnie Reynoso understands phone or video evaluation is not a substitute for face-to-face examination or emergency care. Patient advised to go to ER or call 911 for worsening symptoms or acute distress.      [place], APRN

## 2020-05-18 NOTE — PATIENT INSTRUCTIONS
We are here to support you with Diabetes but please remember that you still need your primary care doctor for your routine health maintenance.    Your A1C: 7.3 % (we will call to get your labs faxed to our office)     This test provides us with your average more than 3 days; fill to 1.8 ml   GVOKE; is the prefilled glucagon syringe- that you should have in case of severe low. Your Vitamin D is too low.    Please start on once a week prescription VIT D: Ergocalciferol 50, 000 units once weekly for 12 doses keep your annual appointments. 4. Dental exam every 6 months. Periodontal disease is 3 times more likely in patients with diabetes.     Thank you   Beba Hamilton  [phone]

## 2020-05-28 ENCOUNTER — TELEPHONE (OUTPATIENT)
Dept: ENDOCRINOLOGY CLINIC | Facility: CLINIC | Age: 45
End: 2020-05-28

## 2020-05-28 NOTE — TELEPHONE ENCOUNTER
[facility] for prescription to be filled out and signed by CAB. Was signed and faxed over. Confirmation sheet was received. Sending to scan copy placed in brown folder.

## 2020-06-01 ENCOUNTER — TELEPHONE (OUTPATIENT)
Dept: ENDOCRINOLOGY CLINIC | Facility: CLINIC | Age: 45
End: 2020-06-01

## 2020-06-09 ENCOUNTER — OFFICE VISIT (OUTPATIENT)
Dept: ENDOCRINOLOGY CLINIC | Facility: CLINIC | Age: 45
End: 2020-06-09
Payer: COMMERCIAL

## 2020-06-09 ENCOUNTER — PATIENT MESSAGE (OUTPATIENT)
Dept: ENDOCRINOLOGY CLINIC | Facility: CLINIC | Age: 45
End: 2020-06-09

## 2020-06-09 VITALS
SYSTOLIC BLOOD PRESSURE: 110 MMHG | HEART RATE: 77 BPM | BODY MASS INDEX: 32.62 KG/M2 | WEIGHT: 233 LBS | HEIGHT: 71 IN | DIASTOLIC BLOOD PRESSURE: 70 MMHG | TEMPERATURE: 97 F

## 2020-06-09 DIAGNOSIS — Z96.41 INSULIN PUMP IN PLACE: Primary | ICD-10-CM

## 2020-06-09 DIAGNOSIS — Z79.4 DIABETES MELLITUS TREATED WITH INSULIN (HCC): ICD-10-CM

## 2020-06-09 DIAGNOSIS — E11.9 DIABETES MELLITUS TREATED WITH INSULIN (HCC): ICD-10-CM

## 2020-06-09 PROCEDURE — 95251 CONT GLUC MNTR ANALYSIS I&R: CPT | Performed by: NURSE PRACTITIONER

## 2020-06-09 PROCEDURE — 99214 OFFICE O/P EST MOD 30 MIN: CPT | Performed by: NURSE PRACTITIONER

## 2020-06-09 RX ORDER — LORAZEPAM 0.5 MG/1
TABLET ORAL
COMMUNITY
Start: 2020-05-18

## 2020-06-09 RX ORDER — GLUCAGON INJECTION, SOLUTION 1 MG/.2ML
INJECTION, SOLUTION SUBCUTANEOUS
COMMUNITY
Start: 2020-05-19

## 2020-06-09 NOTE — PATIENT INSTRUCTIONS
Continue current pump settings. Remember to change pump site every 3 days. Referral for dietitian was entered today; Asad Koroma will call you   We will start Ozempic    Start taking Ozempic ONCE a week.    Dose: 0.25mg once weekly x 4 doses (4 weeks)   If yo

## 2020-06-09 NOTE — PROGRESS NOTES
Vic Loya is a 40year old male who presents for  diabetes management.    Primary care physician: Ganesh Panchal   Most recent A1C (5-2020) A1C was 7.2% - done w PCP office, still awaiting fax   Admits he struggles w weight - would like to lose more we mg/dl    Max bolus: 25 u      HGBA1C:    Lab Results   Component Value Date    A1C 7.3 (A) 06/04/2018    A1C 9.4 (H) 02/04/2018    A1C 7.3 (A) 06/26/2017     (H) 02/04/2018       Lab Results   Component Value Date    CHOLEST 224 (H) 03/06/2018    TR OR   • OTHER SURGICAL HISTORY      DST repair   • OTHER SURGICAL HISTORY  3/6/14    Cysto stent removal- Dr. Gerardo Johnston   • OTHER SURGICAL HISTORY  2/28/14    Cysto, L URS, L stent- Dr. Gerardo Johnston   • OTHER SURGICAL HISTORY      Cysto,Rt RPG,Rt URS, Stent, Dilatio TABLET QD PRN       DM associated review of symptoms:   Endocrine: Polyuria, polyphagia, polydipsia: no  Neuro: Paresthesias: no  HENT: Blurred vision: no  Skin: rash or wounds:  no  Hematological: Hypoglycemia: no      Review of Systems   Constitutional: does not appear to have  type 1 DM- just very insulin resistant ( JAYNA and ISLET cell abx were NEG and cpeptide was 7.9 2018)   Also pt will suspend pump on vacations over 6-8 hours and not have DKA sxs.    Reviewed clinical signifiance of A1c, adverse effec Propanediol (FARXIGA) 5 MG Oral Tab          Si tab daily          Dispense:  90 tablet          Refill:  0          Order Comments: [id number] PCN:SCARLETT GR: [id number] NJ:[id number]      DM Quality Indicators:  A1C as reported above  Retinopathy Screen

## 2020-06-09 NOTE — TELEPHONE ENCOUNTER
From: Hamlet Linder  To: PHILLIP Fisher  Sent: 6/9/2020 10:32 AM CDT  Subject: Prescription Question    I just got message that my farxiga prescription is ready. It is showing only a 30 day supply with a $15 copay.  Do I need to show them anything for

## 2020-06-10 ENCOUNTER — TELEMEDICINE (OUTPATIENT)
Dept: ENDOCRINOLOGY CLINIC | Facility: CLINIC | Age: 45
End: 2020-06-10

## 2020-06-10 DIAGNOSIS — E10.65 UNCONTROLLED TYPE 1 DIABETES MELLITUS WITH HYPERGLYCEMIA (HCC): Primary | ICD-10-CM

## 2020-06-10 PROCEDURE — 97802 MEDICAL NUTRITION INDIV IN: CPT | Performed by: DIETITIAN, REGISTERED

## 2020-06-10 NOTE — PROGRESS NOTES
Alley Padmaja   11/17/1975 was seen for Diabetic Medical Nutrition Therapy:    6/10/2020  Referring Provider: Rupali Paul  Start time: 3:00 End time: 3:30    Due to COVID-19 ACTION PLAN, the patient's office visit was conducted via video.      The patient rao heart healthy. Patient verbalized understanding and has no further questions at this time. Contact information provided. Thank you for the referral.  Pt to follow-up with CAB.   Akiko Turcios MS, RD, CDE, LDN

## 2020-06-15 ENCOUNTER — HOSPITAL ENCOUNTER (OUTPATIENT)
Dept: CT IMAGING | Age: 45
Discharge: HOME | End: 2020-06-15
Attending: PHYSICIAN ASSISTANT
Payer: COMMERCIAL

## 2020-06-15 DIAGNOSIS — R91.8 PULMONARY NODULES: ICD-10-CM

## 2020-06-15 PROCEDURE — 71250 CT THORAX DX C-: CPT | Performed by: PHYSICIAN ASSISTANT

## 2020-07-14 ENCOUNTER — OFFICE VISIT (OUTPATIENT)
Dept: ENDOCRINOLOGY CLINIC | Facility: CLINIC | Age: 45
End: 2020-07-14
Payer: COMMERCIAL

## 2020-07-14 ENCOUNTER — TELEPHONE (OUTPATIENT)
Dept: ENDOCRINOLOGY CLINIC | Facility: CLINIC | Age: 45
End: 2020-07-14

## 2020-07-14 VITALS
SYSTOLIC BLOOD PRESSURE: 102 MMHG | HEART RATE: 72 BPM | HEIGHT: 71 IN | OXYGEN SATURATION: 99 % | BODY MASS INDEX: 31.08 KG/M2 | WEIGHT: 222 LBS | TEMPERATURE: 97 F | DIASTOLIC BLOOD PRESSURE: 60 MMHG

## 2020-07-14 DIAGNOSIS — Z79.4 TYPE 2 DIABETES MELLITUS WITH HYPERGLYCEMIA, WITH LONG-TERM CURRENT USE OF INSULIN (HCC): Primary | ICD-10-CM

## 2020-07-14 DIAGNOSIS — E11.65 TYPE 2 DIABETES MELLITUS WITH HYPERGLYCEMIA, WITH LONG-TERM CURRENT USE OF INSULIN (HCC): Primary | ICD-10-CM

## 2020-07-14 PROCEDURE — 95251 CONT GLUC MNTR ANALYSIS I&R: CPT | Performed by: NURSE PRACTITIONER

## 2020-07-14 PROCEDURE — 99214 OFFICE O/P EST MOD 30 MIN: CPT | Performed by: NURSE PRACTITIONER

## 2020-07-14 RX ORDER — SEMAGLUTIDE 1.34 MG/ML
0.5 INJECTION, SOLUTION SUBCUTANEOUS WEEKLY
Qty: 4.5 ML | Refills: 1 | Status: SHIPPED | COMMUNITY
Start: 2020-07-14 | End: 2021-12-03

## 2020-07-14 RX ORDER — LANCETS
EACH MISCELLANEOUS
Qty: 1 BOX | Refills: 5 | Status: SHIPPED | OUTPATIENT
Start: 2020-07-14

## 2020-07-14 RX ORDER — SEMAGLUTIDE 1.34 MG/ML
0.5 INJECTION, SOLUTION SUBCUTANEOUS WEEKLY
Qty: 4.5 ML | Refills: 1 | Status: SHIPPED | OUTPATIENT
Start: 2020-07-14 | End: 2020-07-14

## 2020-07-14 NOTE — TELEPHONE ENCOUNTER
PA ok per CAB thru cover my meds for ozempic  [id number]    your information has been submitted to [employer]. [employer] is reviewing the PA request and you will receive an electronic response.  You may check for the updated outcome later by reopening

## 2020-07-14 NOTE — PROGRESS NOTES
Lata Corona is a 40year old male who presents for  diabetes management. Primary care physician: Liss Olson   In the past 4 weeks bg trends have improved. Started on Ozempic - has been dosing now for 4 weeks: NO GI side effects.  Does note decrease in 6:30 am 6  4:30 pm : 5.5  9pm: 9.0     ISF : 30   4 hour active insulin  BG target 100-150 mg/dl    Max bolus: 25 u      HGBA1C:    Lab Results   Component Value Date    A1C 7.2 05/14/2020    A1C 7.3 (A) 06/04/2018    A1C 9.4 (H) 02/04/2018     (H 2/28/2014    Performed by Brody Vee MD at [facility] MAIN OR   • OTHER SURGICAL HISTORY      DST repair   • OTHER SURGICAL HISTORY  3/6/14    Cysto stent removal- Dr. Philip Forde   • OTHER SURGICAL HISTORY  2/28/14    Cysto, L URS, L stent- Dr. Philip Forde   • OTHER S mouth nightly.        DM associated review of symptoms:   Endocrine: Polyuria, polyphagia, polydipsia: no  Neuro: Paresthesias: no  HENT: Blurred vision: no  Skin: rash or wounds:  no  Hematological: Hypoglycemia: no but eating defensively       Review of S Reminded again to change reservoir every 3 days (NOT 4) - fill only to 1.1ml marking to avoid waste.  - based on current TDD insulin dosing  May be able to transition off pump to weekly GLP/daily basal  Consider CGM that integrates w smart phone Princess Miranda or patient today. questions were also answered to the best of my knowledge. 45 min spent with patient and >50% time spent counseling and coordinating care related to their office visit.        PHILLIP Stevens

## 2020-07-14 NOTE — PATIENT INSTRUCTIONS
Your trends are much better. 11 pound weight loss.        Continue:     Farxiga 5mg once daily   ozempic 0.5mg once weekly     We have also decreased the insulin pump settings for bolus and basal     Ok to detach pump on vacation but once you get back on

## 2020-07-22 NOTE — TELEPHONE ENCOUNTER
PA was denied stating that doctor must confirm that pt have type 2 diabetes mellitus (a condition that affects the blood sugar)    Please advice?

## 2020-07-23 NOTE — TELEPHONE ENCOUNTER
Called [employer] to schedule a peer to peer per CAB request for ozempic [phone].  appt schedule for 7/28/2020 at 10:45 AM.

## 2020-07-23 NOTE — TELEPHONE ENCOUNTER
Let’s proceed with this- I’m suspicious they think he has type 1 diabetes since he is on pump.     Set up contact or peer to peer please

## 2020-07-28 NOTE — TELEPHONE ENCOUNTER
submitted PA again per CAB type 2 [id number]          Your information has been submitted to [employer]. [employer] is reviewing the PA request and you will receive an electronic response.  You may check for the updated outcome later by reopening this

## 2020-08-20 ENCOUNTER — PATIENT MESSAGE (OUTPATIENT)
Dept: ENDOCRINOLOGY CLINIC | Facility: CLINIC | Age: 45
End: 2020-08-20

## 2020-08-20 NOTE — TELEPHONE ENCOUNTER
From: Sadie Goins  To: PHILLIP Aguilar  Sent: 8/20/2020 9:49 AM CDT  Subject: Other    Gokul Maya,      I wanted to ask if it was possible to get a letter from you for my work. Right now I have to report to work 2 days a week.  It seems like things are

## 2021-03-01 RX ORDER — INSULIN ASPART 100 [IU]/ML
INJECTION, SOLUTION INTRAVENOUS; SUBCUTANEOUS
Qty: 40 ML | Refills: 0 | Status: SHIPPED | OUTPATIENT
Start: 2021-03-01 | End: 2021-06-15

## 2021-03-30 ENCOUNTER — OFFICE VISIT (OUTPATIENT)
Dept: ENDOCRINOLOGY CLINIC | Facility: CLINIC | Age: 46
End: 2021-03-30
Payer: COMMERCIAL

## 2021-03-30 VITALS
OXYGEN SATURATION: 98 % | TEMPERATURE: 98 F | HEIGHT: 71 IN | DIASTOLIC BLOOD PRESSURE: 70 MMHG | WEIGHT: 231 LBS | HEART RATE: 76 BPM | SYSTOLIC BLOOD PRESSURE: 118 MMHG | BODY MASS INDEX: 32.34 KG/M2

## 2021-03-30 DIAGNOSIS — Z96.41 INSULIN PUMP IN PLACE: ICD-10-CM

## 2021-03-30 DIAGNOSIS — Z79.4 TYPE 2 DIABETES MELLITUS WITH HYPERGLYCEMIA, WITH LONG-TERM CURRENT USE OF INSULIN (HCC): Primary | ICD-10-CM

## 2021-03-30 DIAGNOSIS — E11.65 TYPE 2 DIABETES MELLITUS WITH HYPERGLYCEMIA, WITH LONG-TERM CURRENT USE OF INSULIN (HCC): Primary | ICD-10-CM

## 2021-03-30 DIAGNOSIS — E78.5 HYPERLIPIDEMIA LDL GOAL <100: ICD-10-CM

## 2021-03-30 LAB
CARTRIDGE LOT#: 773 NUMERIC
HEMOGLOBIN A1C: 6.5 % (ref 4.3–5.6)

## 2021-03-30 PROCEDURE — 99214 OFFICE O/P EST MOD 30 MIN: CPT | Performed by: NURSE PRACTITIONER

## 2021-03-30 PROCEDURE — 83036 HEMOGLOBIN GLYCOSYLATED A1C: CPT | Performed by: NURSE PRACTITIONER

## 2021-03-30 PROCEDURE — 3074F SYST BP LT 130 MM HG: CPT | Performed by: NURSE PRACTITIONER

## 2021-03-30 PROCEDURE — 3078F DIAST BP <80 MM HG: CPT | Performed by: NURSE PRACTITIONER

## 2021-03-30 PROCEDURE — 3008F BODY MASS INDEX DOCD: CPT | Performed by: NURSE PRACTITIONER

## 2021-03-30 PROCEDURE — 95251 CONT GLUC MNTR ANALYSIS I&R: CPT | Performed by: NURSE PRACTITIONER

## 2021-03-30 RX ORDER — ERGOCALCIFEROL 1.25 MG/1
50000 CAPSULE ORAL
COMMUNITY
Start: 2021-02-28

## 2021-03-30 NOTE — PROGRESS NOTES
Cheri Larsen is a 39year old male presents for diabetes management.    Primary care physician: Mainor Marshall   Last appointment : 7-2020   Having lab work done end of month w PCP     In the past 3m DM control has improved : A1C today is 6.5%   (last  A1C  7 05/14/2020    A1C 7.3 (A) 06/04/2018     (H) 02/04/2018       Lab Results   Component Value Date    CHOLEST 24 05/14/2020    TRIG 201 05/14/2020    HDL 21 (L) 03/06/2018    LDL  03/06/2018      Comment:      Unable to calculate LDL when TGL >400 Dilation   • OTHER SURGICAL HISTORY  02/28/2018    Cysto/Stent Removal- Dr. Hung Parson    • TONSILLECTOMY       Social History    Tobacco Use      Smoking status: Former Smoker        Packs/day: 1.00        Years: 5.00        Pack years: 5        Types: Ci Blurred vision: no  Skin: no rash or wounds  Hematological: Hypoglycemia: no    Review of Systems     LUNGS: denies shortness of breath   CARDIOVASCULAR: denies chest pain  GI: denies abdominal pain, nausea, vomiting or diarrhea   : denies dysuria  [facility] targets (Fasting < 130 and post prandial <138 ) and complications associated with hyperglycemia and uncontrolled DM (on AVS)   Recommended SMBG 4- x daily IF NOT USING CGM for BG tracking  Reviewed s/s and treatment of hypoglycemia (on AVS)   Reminded to c

## 2021-03-30 NOTE — PATIENT INSTRUCTIONS
Your A1C: 6.5%  We decreased basal rates. Please upload eye exam   Have labs done; we will check B12 to see if there is deficiency   The A1C test provides us with your average blood sugar for the past 3 months.  Keeping an A1C less than 7% helps reduce or eat a small snack. 5. If you’re not sure what caused your low blood glucose, call your healthcare provider.   6. Always check your blood glucose before you drive       [facility] Refill policy:     · Allow 2-3 business days for refills  · Contact your

## 2021-04-12 ENCOUNTER — TELEPHONE (OUTPATIENT)
Dept: ENDOCRINOLOGY CLINIC | Facility: CLINIC | Age: 46
End: 2021-04-12

## 2021-04-12 NOTE — TELEPHONE ENCOUNTER
Received fax from [employer] for CMN of replacement of sensor to be replaced. CAB signed and faxed over. Confirmation sheet was received.

## 2021-05-14 ENCOUNTER — PATIENT MESSAGE (OUTPATIENT)
Dept: ENDOCRINOLOGY CLINIC | Facility: CLINIC | Age: 46
End: 2021-05-14

## 2021-05-14 RX ORDER — DAPAGLIFLOZIN 5 MG/1
TABLET, FILM COATED ORAL
Qty: 90 TABLET | Refills: 0 | Status: SHIPPED | OUTPATIENT
Start: 2021-05-14 | End: 2021-12-06

## 2021-05-14 NOTE — TELEPHONE ENCOUNTER
Requested Prescriptions     Pending Prescriptions Disp Refills   • FARXIGA 5 MG Oral Tab [Pharmacy Med Name: FARXIGA 5MG TABLETS] 90 tablet 0     Sig: TAKE 1 TABLET BY MOUTH DAILY     FILLED- 06/09/20  LOV- 03/30/21  [facility] [place] message sent to the pt

## 2021-05-14 NOTE — TELEPHONE ENCOUNTER
Future Appointments   Date Time Provider Lee Mirza   8/5/2021  8:00 AM Marisela Hair, PHILLIP [facility] [address] [address]

## 2021-05-17 NOTE — TELEPHONE ENCOUNTER
From: Bonnie Reynoso  To: PHILLIP Dixon  Sent: 5/14/2021 4:49 PM CDT  Subject: Prescription Question    I sent in refills for a few prescriptions today, one being my insulin.  When I picked it up, I paid my full copay but was only given 1 vial of insul

## 2021-06-07 ENCOUNTER — TELEPHONE (OUTPATIENT)
Dept: ENDOCRINOLOGY CLINIC | Facility: CLINIC | Age: 46
End: 2021-06-07

## 2021-06-07 NOTE — TELEPHONE ENCOUNTER
Received fax from [address] [place] [address] lab results    Abstracted lab results    Sent form to scan

## 2021-06-15 RX ORDER — INSULIN ASPART 100 [IU]/ML
INJECTION, SOLUTION INTRAVENOUS; SUBCUTANEOUS
Qty: 40 ML | Refills: 0 | Status: SHIPPED | OUTPATIENT
Start: 2021-06-15 | End: 2021-12-06 | Stop reason: ALTCHOICE

## 2021-06-15 NOTE — TELEPHONE ENCOUNTER
Requested Prescriptions     Pending Prescriptions Disp Refills   • insulin aspart 100 UNIT/ML Subcutaneous Solution [Pharmacy Med Name: INSULIN ASPART 100/ML INJ,10ML] 40 mL 0     Sig: INJECT UP  UNITS DAILY VIA PUMP AS DIRECTED     FILLED- 03/01/21

## 2021-08-05 ENCOUNTER — OFFICE VISIT (OUTPATIENT)
Dept: ENDOCRINOLOGY CLINIC | Facility: CLINIC | Age: 46
End: 2021-08-05
Payer: COMMERCIAL

## 2021-08-05 ENCOUNTER — TELEPHONE (OUTPATIENT)
Dept: ENDOCRINOLOGY CLINIC | Facility: CLINIC | Age: 46
End: 2021-08-05

## 2021-08-05 VITALS
WEIGHT: 207 LBS | BODY MASS INDEX: 29 KG/M2 | HEART RATE: 74 BPM | OXYGEN SATURATION: 98 % | SYSTOLIC BLOOD PRESSURE: 108 MMHG | RESPIRATION RATE: 20 BRPM | DIASTOLIC BLOOD PRESSURE: 62 MMHG

## 2021-08-05 DIAGNOSIS — E78.5 DYSLIPIDEMIA: ICD-10-CM

## 2021-08-05 DIAGNOSIS — E11.649 TYPE 2 DIABETES MELLITUS WITH HYPOGLYCEMIA WITHOUT COMA, WITH LONG-TERM CURRENT USE OF INSULIN (HCC): Primary | ICD-10-CM

## 2021-08-05 DIAGNOSIS — Z79.4 TYPE 2 DIABETES MELLITUS WITH HYPOGLYCEMIA WITHOUT COMA, WITH LONG-TERM CURRENT USE OF INSULIN (HCC): Primary | ICD-10-CM

## 2021-08-05 PROBLEM — E53.8 B12 DEFICIENCY: Status: ACTIVE | Noted: 2021-08-05

## 2021-08-05 PROBLEM — Z96.41 INSULIN PUMP IN PLACE: Status: RESOLVED | Noted: 2021-03-30 | Resolved: 2021-08-05

## 2021-08-05 PROBLEM — E11.9 TYPE 2 DIABETES MELLITUS WITHOUT COMPLICATION (HCC): Status: ACTIVE | Noted: 2018-03-06

## 2021-08-05 PROCEDURE — 3078F DIAST BP <80 MM HG: CPT | Performed by: NURSE PRACTITIONER

## 2021-08-05 PROCEDURE — 99215 OFFICE O/P EST HI 40 MIN: CPT | Performed by: NURSE PRACTITIONER

## 2021-08-05 PROCEDURE — 3074F SYST BP LT 130 MM HG: CPT | Performed by: NURSE PRACTITIONER

## 2021-08-05 PROCEDURE — 95251 CONT GLUC MNTR ANALYSIS I&R: CPT | Performed by: NURSE PRACTITIONER

## 2021-08-05 RX ORDER — BLOOD-GLUCOSE TRANSMITTER
EACH MISCELLANEOUS
Qty: 1 EACH | Refills: 3 | Status: SHIPPED | OUTPATIENT
Start: 2021-08-05

## 2021-08-05 RX ORDER — INSULIN DEGLUDEC INJECTION 100 U/ML
INJECTION, SOLUTION SUBCUTANEOUS
Qty: 1 EACH | Refills: 0 | COMMUNITY
Start: 2021-08-05 | End: 2021-12-06 | Stop reason: ALTCHOICE

## 2021-08-05 RX ORDER — BLOOD-GLUCOSE SENSOR
1 EACH MISCELLANEOUS
Qty: 3 EACH | Refills: 12 | Status: SHIPPED | OUTPATIENT
Start: 2021-08-05

## 2021-08-05 NOTE — PROGRESS NOTES
Jonathon Abdul is a 39year old male presents for diabetes management. Primary care physician: Sharif Benson        In the past 3m DM control has improved .  Most recent A1C 5.5% on 6-5-2021 (last A1C  6.5%)     Wearing Medtronic 670 G w guardian sensor - ha active insulin      BG target 100-150 mg/dl    Max bolus: 25 u      HGBA1C:    Lab Results   Component Value Date    A1C 5.5 06/05/2021    A1C 6.5 (A) 03/30/2021    A1C 7.2 05/14/2020     (H) 02/04/2018       Lab Results   Component Value Date    [facility] Dilation   • OTHER SURGICAL HISTORY  02/28/2018    Cysto/Stent Removal- Dr. Mau Abbott    • TONSILLECTOMY       Social History    Tobacco Use      Smoking status: Former Smoker        Packs/day: 1.00        Years: 5.00        Pack years: 5        Types: Ci review of  symptoms:   Endocrine: Polyuria, polyphagia, polydipsia: no  Neurological: Paresthesias: + transient to LE   HEENT: Blurred vision: no  Skin: no rash or wounds  Hematological: Hypoglycemia: Yes, see above .  Wearing CGM     Review of Systems steaming w diabetes center to share BG trends   rx for Dexcom sent to [facility] - may need to go through DME     Reviewed clinical signifiance of A1c, adverse effects of suboptimal glucose control, and goals of therapy   Reminded pt on A1C and blood sugar including sensor interpretation time if applicable  The risks and benefits of my recommendations, as well as other treatment options were discussed with the patient today. questions were also answered to the best of my knowledge.

## 2021-08-05 NOTE — PATIENT INSTRUCTIONS
We are here to help you manage your diabetes. Please continue with your primary care physician/provider for your routine health care maintenance     Your A1C: 5.5%  You have lost weight as well.    Great job       The A1C test provides us with your average daily   It would be best to change up the times of day that you are testing your sugar. Recommended times to test: Before breakfast (fasting) and then alternate testing blood sugar 2 hours after your meals.    Blood sugar targets:  Before breakfast:  80-1

## 2021-10-12 ENCOUNTER — TELEPHONE (OUTPATIENT)
Dept: ENDOCRINOLOGY CLINIC | Facility: CLINIC | Age: 46
End: 2021-10-12

## 2021-12-03 RX ORDER — SEMAGLUTIDE 1.34 MG/ML
INJECTION, SOLUTION SUBCUTANEOUS
Qty: 4.5 ML | Refills: 1 | Status: SHIPPED | OUTPATIENT
Start: 2021-12-03 | End: 2021-12-06

## 2021-12-03 NOTE — TELEPHONE ENCOUNTER
Requested Prescriptions     Pending Prescriptions Disp Refills   • OZEMPIC, 0.25 OR 0.5 MG/DOSE, 2 MG/1.5ML Subcutaneous Solution Pen-injector [Pharmacy Med Name: OZEMPIC 0.25 OR 0.5MG/DOS 1X2MG PEN] 4.5 mL 1     Sig: INJECT 0.5 MG INTO THE SKIN EVERY WEEK

## 2021-12-06 ENCOUNTER — TELEMEDICINE (OUTPATIENT)
Dept: ENDOCRINOLOGY CLINIC | Facility: CLINIC | Age: 46
End: 2021-12-06

## 2021-12-06 DIAGNOSIS — E78.5 DYSLIPIDEMIA: ICD-10-CM

## 2021-12-06 DIAGNOSIS — E11.65 TYPE 2 DIABETES MELLITUS WITH HYPERGLYCEMIA, WITHOUT LONG-TERM CURRENT USE OF INSULIN (HCC): Primary | ICD-10-CM

## 2021-12-06 PROCEDURE — 95251 CONT GLUC MNTR ANALYSIS I&R: CPT | Performed by: NURSE PRACTITIONER

## 2021-12-06 PROCEDURE — 99214 OFFICE O/P EST MOD 30 MIN: CPT | Performed by: NURSE PRACTITIONER

## 2021-12-06 RX ORDER — SEMAGLUTIDE 1.34 MG/ML
1.5 INJECTION, SOLUTION SUBCUTANEOUS WEEKLY
Qty: 4.5 ML | Refills: 1 | Status: SHIPPED | OUTPATIENT
Start: 2021-12-06 | End: 2021-12-08

## 2021-12-06 NOTE — PATIENT INSTRUCTIONS
We are here to help you manage your diabetes.  Please continue with your primary care physician/provider for your routine health care maintenance     Your A1C: update A1C 1- 2022   You are doing really well from diabetes standpoint   For cholesterol, we may irritability  · Blurred eyesight  · Having nightmares or waking up confused or sweating  · Numbness or tingling in the lips or tongue    Treatment of Low Blood sugar Action Plan  1. Check blood glucose to be sure that it is low.  You can’t always go by symp medication available.   We will gladly send a new prescription to that pharmacy at your request.     Thank you   Minnie Loya   [phone]

## 2021-12-06 NOTE — PROGRESS NOTES
Due to COVID-19 ACTION PLAN, the patient's office visit was converted to a video visit. Please note that the following visit was completed using two-way, real-time interactive audio and video communication.   Time Spent:  21 min     Brandan aguilar a 55 ye 10/06/2021    GFRNAA 74 03/06/2019    GFRAA 86 03/06/2019    AST 16 10/06/2021    ALT 13 10/06/2021     Labs in media: 6-5-2021   VIT D: 47  TSH 1.44  B12 103     ALT/AST: 13/14  Urine ma/lb: <0.7   GFR 78  Creatinine 1.22       Component      Latest Ref R Onset   • Cancer Mother         polpys before age [id number] Abraham Mackey   • Cancer Maternal Grandfather         colon cancer   • Cancer Paternal Grandfather         colon cancer   • Diabetes Father         Type 2 DM     Current Medication List:   Current Outpatient Medicatio Pulmonary/Chest: Effort normal  Neurological: Alert and oriented . Psychiatric: Normal mood and affect. Assessment/Plan:    Dyslipidemia:  Last labs    Continue Rosuvastatin/fibrate rx.        Type 2 diabetes with hypoglycemia   A1C update dilated eye exam: No data recorded Exam shows retinopathy?  No data recorded- will fax recent eye exam; 1-2021 per pt   Last diabetic foot exam: Last Foot Exam: 03/30/21  Date of last PHQ-2 depression screen: PHQ-2 - Date of last depression screening: 3/30/

## 2021-12-08 NOTE — TELEPHONE ENCOUNTER
Received fax from pharmacy requesting clarification on ozempic dosing, written inject 1.5mg once a week. Will pend for 0.5 mg once weekly.

## 2021-12-09 RX ORDER — SEMAGLUTIDE 1.34 MG/ML
0.5 INJECTION, SOLUTION SUBCUTANEOUS WEEKLY
Qty: 4.5 ML | Refills: 1 | Status: SHIPPED | OUTPATIENT
Start: 2021-12-09

## 2023-09-21 ENCOUNTER — HOSPITAL ENCOUNTER (EMERGENCY)
Facility: HOSPITAL | Age: 48
Discharge: HOME | End: 2023-09-21
Attending: EMERGENCY MEDICINE
Payer: COMMERCIAL

## 2023-09-21 ENCOUNTER — APPOINTMENT (OUTPATIENT)
Dept: ULTRASOUND IMAGING | Facility: HOSPITAL | Age: 48
End: 2023-09-21
Attending: EMERGENCY MEDICINE
Payer: COMMERCIAL

## 2023-09-21 ENCOUNTER — APPOINTMENT (OUTPATIENT)
Dept: CT IMAGING | Facility: HOSPITAL | Age: 48
End: 2023-09-21
Attending: EMERGENCY MEDICINE
Payer: COMMERCIAL

## 2023-09-21 VITALS
OXYGEN SATURATION: 98 % | RESPIRATION RATE: 18 BRPM | HEART RATE: 59 BPM | BODY MASS INDEX: 30.1 KG/M2 | SYSTOLIC BLOOD PRESSURE: 135 MMHG | WEIGHT: 215 LBS | DIASTOLIC BLOOD PRESSURE: 99 MMHG | TEMPERATURE: 98 F | HEIGHT: 71 IN

## 2023-09-21 DIAGNOSIS — R10.31 RIGHT INGUINAL PAIN: Primary | ICD-10-CM

## 2023-09-21 LAB
ALBUMIN SERPL-MCNC: 4 G/DL (ref 3.4–5)
ALBUMIN/GLOB SERPL: 1.1 {RATIO} (ref 1–2)
ALP LIVER SERPL-CCNC: 60 U/L
ALT SERPL-CCNC: 30 U/L
ANION GAP SERPL CALC-SCNC: 9 MMOL/L (ref 0–18)
AST SERPL-CCNC: 13 U/L (ref 15–37)
BASOPHILS # BLD AUTO: 0.05 X10(3) UL (ref 0–0.2)
BASOPHILS NFR BLD AUTO: 0.9 %
BILIRUB SERPL-MCNC: 1 MG/DL (ref 0.1–2)
BILIRUB UR QL STRIP.AUTO: NEGATIVE
BUN BLD-MCNC: 10 MG/DL (ref 7–18)
CALCIUM BLD-MCNC: 9.3 MG/DL (ref 8.5–10.1)
CHLORIDE SERPL-SCNC: 102 MMOL/L (ref 98–112)
CLARITY UR REFRACT.AUTO: CLEAR
CO2 SERPL-SCNC: 24 MMOL/L (ref 21–32)
COLOR UR AUTO: COLORLESS
CREAT BLD-MCNC: 1.09 MG/DL
EGFRCR SERPLBLD CKD-EPI 2021: 84 ML/MIN/1.73M2 (ref 60–?)
EOSINOPHIL # BLD AUTO: 0.12 X10(3) UL (ref 0–0.7)
EOSINOPHIL NFR BLD AUTO: 2.1 %
ERYTHROCYTE [DISTWIDTH] IN BLOOD BY AUTOMATED COUNT: 12.4 %
GLOBULIN PLAS-MCNC: 3.5 G/DL (ref 2.8–4.4)
GLUCOSE BLD-MCNC: 246 MG/DL (ref 70–99)
GLUCOSE UR STRIP.AUTO-MCNC: 300 MG/DL
HCT VFR BLD AUTO: 46.7 %
HGB BLD-MCNC: 16.7 G/DL
IMM GRANULOCYTES # BLD AUTO: 0.06 X10(3) UL (ref 0–1)
IMM GRANULOCYTES NFR BLD: 1.1 %
KETONES UR STRIP.AUTO-MCNC: NEGATIVE MG/DL
LEUKOCYTE ESTERASE UR QL STRIP.AUTO: NEGATIVE
LYMPHOCYTES # BLD AUTO: 1.62 X10(3) UL (ref 1–4)
LYMPHOCYTES NFR BLD AUTO: 28.8 %
MCH RBC QN AUTO: 29.8 PG (ref 26–34)
MCHC RBC AUTO-ENTMCNC: 35.8 G/DL (ref 31–37)
MCV RBC AUTO: 83.4 FL
MONOCYTES # BLD AUTO: 0.57 X10(3) UL (ref 0.1–1)
MONOCYTES NFR BLD AUTO: 10.1 %
NEUTROPHILS # BLD AUTO: 3.21 X10 (3) UL (ref 1.5–7.7)
NEUTROPHILS # BLD AUTO: 3.21 X10(3) UL (ref 1.5–7.7)
NEUTROPHILS NFR BLD AUTO: 57 %
NITRITE UR QL STRIP.AUTO: NEGATIVE
OSMOLALITY SERPL CALC.SUM OF ELEC: 287 MOSM/KG (ref 275–295)
PH UR STRIP.AUTO: 6.5 [PH] (ref 5–8)
PLATELET # BLD AUTO: 199 10(3)UL (ref 150–450)
POTASSIUM SERPL-SCNC: 3.8 MMOL/L (ref 3.5–5.1)
PROT SERPL-MCNC: 7.5 G/DL (ref 6.4–8.2)
PROT UR STRIP.AUTO-MCNC: NEGATIVE MG/DL
RBC # BLD AUTO: 5.6 X10(6)UL
RBC UR QL AUTO: NEGATIVE
SODIUM SERPL-SCNC: 135 MMOL/L (ref 136–145)
SP GR UR STRIP.AUTO: <1.005 (ref 1–1.03)
UROBILINOGEN UR STRIP.AUTO-MCNC: NORMAL MG/DL
WBC # BLD AUTO: 5.6 X10(3) UL (ref 4–11)

## 2023-09-21 PROCEDURE — 85025 COMPLETE CBC W/AUTO DIFF WBC: CPT | Performed by: EMERGENCY MEDICINE

## 2023-09-21 PROCEDURE — 99284 EMERGENCY DEPT VISIT MOD MDM: CPT

## 2023-09-21 PROCEDURE — 93975 VASCULAR STUDY: CPT | Performed by: EMERGENCY MEDICINE

## 2023-09-21 PROCEDURE — 81003 URINALYSIS AUTO W/O SCOPE: CPT | Performed by: EMERGENCY MEDICINE

## 2023-09-21 PROCEDURE — 96376 TX/PRO/DX INJ SAME DRUG ADON: CPT

## 2023-09-21 PROCEDURE — 80053 COMPREHEN METABOLIC PANEL: CPT | Performed by: EMERGENCY MEDICINE

## 2023-09-21 PROCEDURE — 96374 THER/PROPH/DIAG INJ IV PUSH: CPT

## 2023-09-21 PROCEDURE — 85025 COMPLETE CBC W/AUTO DIFF WBC: CPT

## 2023-09-21 PROCEDURE — 76870 US EXAM SCROTUM: CPT | Performed by: EMERGENCY MEDICINE

## 2023-09-21 PROCEDURE — 80053 COMPREHEN METABOLIC PANEL: CPT

## 2023-09-21 PROCEDURE — 74177 CT ABD & PELVIS W/CONTRAST: CPT | Performed by: EMERGENCY MEDICINE

## 2023-09-21 PROCEDURE — 96375 TX/PRO/DX INJ NEW DRUG ADDON: CPT

## 2023-09-21 RX ORDER — HYDROMORPHONE HYDROCHLORIDE 1 MG/ML
0.5 INJECTION, SOLUTION INTRAMUSCULAR; INTRAVENOUS; SUBCUTANEOUS EVERY 30 MIN PRN
Status: DISCONTINUED | OUTPATIENT
Start: 2023-09-21 | End: 2023-09-21

## 2023-09-21 RX ORDER — ONDANSETRON 2 MG/ML
4 INJECTION INTRAMUSCULAR; INTRAVENOUS ONCE
Status: COMPLETED | OUTPATIENT
Start: 2023-09-21 | End: 2023-09-21

## 2023-09-21 RX ORDER — HYDROCODONE BITARTRATE AND ACETAMINOPHEN 5; 325 MG/1; MG/1
1 TABLET ORAL EVERY 6 HOURS PRN
Qty: 10 TABLET | Refills: 0 | Status: SHIPPED | OUTPATIENT
Start: 2023-09-21 | End: 2023-09-26

## 2023-09-21 NOTE — ED QUICK NOTES
Report received from [facility]. Assumed care of pt at this time. Pt is a&ox4.  Awaiting ERMD eval.

## 2023-09-21 NOTE — ED QUICK NOTES
Rounding Completed    Plan of Care reviewed. Waiting for ultrasound results. Elimination needs assessed. Bed is locked and in lowest position. Call light within reach. Family at bedside.

## 2023-09-21 NOTE — ED QUICK NOTES
Rounding Completed    Plan of Care reviewed. Waiting for further orders from ERMD.  Elimination needs assessed. Provided warm blanket. Bed is locked and in lowest position. Call light within reach. Family at bedside.

## 2023-09-21 NOTE — ED INITIAL ASSESSMENT (HPI)
Patient sent from PCP office. Thinks he might have a right groin hernia. Having pain and PCP felt a lump. Patient is ambulatory.

## 2024-05-13 NOTE — PROGRESS NOTES
Chief Complaint   Patient presents with    Diabetes     New/returning pt not checking BG in office 109. Stopped Ozempic 1 year ago and is only taking Jardiance 10mg          Hernandez Roth is a 48 year old presenting for diabetes management.   Primary care physician: Abdoul Turner      Today's A1C 6.8% ( last A1C 5.6 % 2021)   Last DM appt w me   Diabetes was well controlled so he maintained his care w PCP          Has been off Ozempic ~1 year due to cost  Switched from Farxiga to Jardiance (insurance)   Feels the difference off ozempic   With job, he is on high deductible insurance with HSA that is funded with by employer     Feels he has enough in his HSA to cover his deductible to revisit GLP1 rx  Does not routinely test BG trends     Seeing urology  Dr Raffi Youssef - but will not rx testosterone:referred to endo but could not get appt for several months       Has annual labs done w employer; pt has results at home; will send in via my chart      Last refill on surescrupts: jardiance 10mg (30 d supply 1-2024)          Diabetes History:   DM 2-2014   Patient has had hospitalizations for blood sugar issues (hypolycemia at insulin initiation)   denies any history of pancreatitis      Previous DM therapies:  Metformin ER, IR: severe GI   Glimepiride: change in therapy    Medtronic 670 G 6-2021 : transitioned off pump due to improved trends  Faxiga: formulary 2021     Current DM Regimen:  Jardiance 10mg  once daily  Ozempic 0.5mg subcutaneous once weekly (  off 1 year due to cost)         HGBA1C:    Lab Results   Component Value Date    A1C 6.8 (A) 05/14/2024    A1C 5.6 10/06/2021    A1C 5.5 06/05/2021     (H) 02/04/2018         Lab Results   Component Value Date    CHOLEST 137 10/06/2021    CHOLEST 122 06/05/2021    TRIG 145 10/06/2021    TRIG 103 06/05/2021    HDL 33 06/05/2021    HDL 21 (L) 03/06/2018    LDL  03/06/2018      Comment:      Unable to calculate LDL when TGL >400      LDL   07/13/2016      Comment:      Unable to calculate LDL when TGL > 400.       Lab Results   Component Value Date    MICROALBCREA 14.1 03/06/2018    MICROALBCREA 14.5 07/13/2016      Lab Results   Component Value Date    CREATSERUM 1.09 09/21/2023    CREATSERUM 1.15 10/06/2021    EGFRCR 84 09/21/2023     Lab Results   Component Value Date    AST 13 (L) 09/21/2023    AST 16 10/06/2021    ALT 30 09/21/2023    ALT 13 10/06/2021       Lab Results   Component Value Date    TSH 1.333 03/06/2018    TSH 1.570 01/24/2015           Component      Latest Ref Rng & Units 10/1/2015   C-PEPTIDE, SERUM      1.1 - 4.4 ng/mL 7.9 (H)   ANTIPANCREATIC ISLET CELLS      Neg:<1:1 Negative       JAYNA-65      0.0 - 1.5 U/mL <1.0       DM Complications:  Microvascular:   Neuropathy: no  Retinopathy: no  Nephropathy: no    Macrovascular:  PVD: no  CAD: no  Stroke/CVA: no    Modifying factors:  Medication adherence: yes  Barriers: Cost of rx   Recent steroids, illness or infections: ( past 90 d) no     Allergies: Mold, Pollen, and Metformin    Past Medical History:    Hyperlipidemia LDL goal < 100    Hypertriglyceridemia    TG levels range from 400-700s    Incomplete RBBB    Dx in 2013    Nephrolithiasis    Dx in 2014 - had cysto    Obesity (BMI 30-39.9)    BMI 30    MELVI on CPAP    Type II or unspecified type diabetes mellitus without mention of complication, uncontrolled    Dx at age 37 in 2/2013    Visual impairment    Blurry vision     Past Surgical History:   Procedure Laterality Date    Colonoscopy  2002    negative    Other surgical history      DST repair    Other surgical history  3/6/14    Cysto stent removal- Dr. Linares    Other surgical history  2/28/14    Cysto, L URS, L stent- Dr. Linares    Other surgical history      Cysto,Rt RPG,Rt URS, Stent, Dilation    Other surgical history  02/28/2018    Cysto/Stent Removal- Dr. Hernandez     Tonsillectomy       Social History     Socioeconomic History    Marital status:     Number of  children: 2   Occupational History    Occupation: High school guidance counselor     Employer: [employer]   Tobacco Use    Smoking status: Former     Current packs/day: 0.00     Average packs/day: 1 pack/day for 5.0 years (5.0 ttl pk-yrs)     Types: Cigarettes     Start date: 1994     Quit date: 1999     Years since quittin.4    Smokeless tobacco: Never   Vaping Use    Vaping status: Never Used   Substance and Sexual Activity    Alcohol use: Yes     Comment: 2 wine and 1 beer a week    Drug use: No    Sexual activity: Yes     Partners: Female     Social Determinants of Health      [facility] from [facility]    Housing Stability     Family History   Problem Relation Age of Onset    Cancer Mother         polpys before age 30    Cancer Maternal Grandfather         colon cancer    Cancer Paternal Grandfather         colon cancer    Diabetes Father         Type 2 DM     Current Medication List:   Current Outpatient Medications   Medication Sig Dispense Refill    gabapentin 300 MG Oral Cap Take 1 capsule (300 mg total) by mouth 2 (two) times daily.      TESTOSTERONE IM Inject 200 mg/mL into the muscle.      semaglutide (OZEMPIC, 0.25 OR 0.5 MG/DOSE,) 2 MG/3ML Subcutaneous Solution Pen-injector As directed 3 mL 0    semaglutide 4 MG/3ML Subcutaneous Solution Pen-injector 1mg weekly as directed 3 mL 1    ergocalciferol 1.25 MG (86420 UT) Oral Cap Take 1 capsule (50,000 Units total) by mouth twice a week.      Fenofibrate 145 MG Oral Tab TK 1 T PO QD  1    Rosuvastatin Calcium 10 MG Oral Tab Take 1 tablet (10 mg total) by mouth nightly.      PEG 3350-KCl-Na Bicarb-NaCl 420 g Oral Recon Soln Take as directed by physician. (Patient not taking: Reported on 2024) 4000 mL 0         DM associated review of  symptoms:   Endocrine: Polyuria, polyphagia, polydipsia: no  Neurological: Paresthesias: yes LE   HEENT: Blurred vision: no  Skin: no rash or wounds  Hematological:  Hypoglycemia: no    Review of Systems     LUNGS: denies shortness of breath   CARDIOVASCULAR: denies chest pain  GI: denies abdominal pain, nausea or diarrhea   : denies dysuria  MUSCULOSKELETAL: denies pain        Physical Exam   Vitals reviewed.  Constitutional: Normal appearance   Cardiovascular: Normal rate , rhythm   Pulmonary/Chest: Effort normal  Neurological: Alert and oriented .   Psychiatric: Normal mood and affect.           Assessment/Plan:      Low testosterone  [facility] endo provider contact on AVS    Dyslipidemia:  Last labs    Per pt, has annual labs w employer physical   Will send in results via my chart   Continue Rosuvastatin/fibrate rx.       Type 2 diabetes with hypoglycemia   A1C 6.8% ( last A1c  5.6%)   Weight: 203 lb ( last weight 207 lb )   Diabetes control is increased       ~NO Metformin due to past hx GI issues  (XR/IR )   Discussed benefit of GLP1 for T2DM and how it impacts 6 of the 8 defects w T2DM v SGLT2 targets only kidney excretion of BG   Agreeable to restart Ozempic   Stop Jardiance rx   Start: Ozempic 0.25mg subcutaneous once weekly x 4 weeks, then increase to 0.5mg ozempic    Will use 1mg Ozempic pen for 0.5mg dosing (36 clicks) to help with OOP cost   Continue site roation for weekly injections      Reviewed clinical signifiance of A1c, adverse effects of suboptimal glucose control, and goals of therapy   Reminded pt on A1C and blood sugar targets (Fasting < 130 and post prandial <180 ) and complications associated with hyperglycemia and uncontrolled DM (on AVS)   Recommended SMBG 2- x weekly  Continue with lifestyle modifications since they have positive impact on diabetes/blood sugars/health (portion control, physical activity, weight loss)   Urine m/alb collected today    The patient is asked to return in 6- m but recommended to contact DM clinic sooner if questions or concerns.    The patient indicates understanding of these issues and agrees to the plan.    Orders  Placed This Encounter    HEMOGLOBIN A1C     Order Specific Question:   Release to patient     Answer:   Immediate    ASSAY QUANTITATIVE, GLUCOSE     Order Specific Question:   Release to patient     Answer:   Immediate    Microalb/Creat Ratio, Random Urine     Standing Status:   Future     Number of Occurrences:   1     Standing Expiration Date:   2025     Order Specific Question:   Release to patient     Answer:   Immediate    DISCONTD: JARDIANCE 10 MG Oral Tab     Sig: Take 1 tablet (10 mg total) by mouth daily.    gabapentin 300 MG Oral Cap     Sig: Take 1 capsule (300 mg total) by mouth 2 (two) times daily.    TESTOSTERONE IM     Sig: Inject 200 mg/mL into the muscle.    semaglutide (OZEMPIC, 0.25 OR 0.5 MG/DOSE,) 2 MG/3ML Subcutaneous Solution Pen-injector     Sig: As directed     Dispense:  3 mL     Refill:  0     LOT [id number] EX 10/31/2025 1 bx    semaglutide 4 MG/3ML Subcutaneous Solution Pen-injector     Simg weekly as directed     Dispense:  3 mL     Refill:  1     DM quality  A1C/Blood pressure: as reported above   Nephropathy screening: NEG  No indication for ace /arb rx.   LIPID screening:  ; rosuvastatin rx/fibrate .   Last dilated eye exam: No data recorded Exam shows retinopathy? No data recorded  Last diabetic foot exam: No data recorded    Defer foot exam to follow up appointment   Call [facility] for DM eye exam: ()           instructed patient on GLP rx  pen use, dosing w with the GLP rx pen, pen needles, site selection for subcutaneous injections, rotation and subcutaneous  injection administration.  Ozempic 0.25mg  dose was self-administered today in clinic and demonstrated good technique and understanding of above content.     Spent 40 min obtaining patient history, evaluating patient, reviewing blood glucose trends, discussing treatment options, lifestyle modifications and completing documentation -  The risks and benefits of my recommendations, as well as other  treatment options were discussed with the patient today. questions were also answered to the best of my knowledge.

## 2024-05-14 ENCOUNTER — OFFICE VISIT (OUTPATIENT)
Dept: ENDOCRINOLOGY CLINIC | Facility: CLINIC | Age: 49
End: 2024-05-14

## 2024-05-14 ENCOUNTER — TELEPHONE (OUTPATIENT)
Dept: ENDOCRINOLOGY CLINIC | Facility: CLINIC | Age: 49
End: 2024-05-14

## 2024-05-14 VITALS
SYSTOLIC BLOOD PRESSURE: 118 MMHG | HEART RATE: 63 BPM | RESPIRATION RATE: 16 BRPM | WEIGHT: 203 LBS | OXYGEN SATURATION: 96 % | BODY MASS INDEX: 28 KG/M2 | DIASTOLIC BLOOD PRESSURE: 64 MMHG

## 2024-05-14 DIAGNOSIS — E78.5 DYSLIPIDEMIA: ICD-10-CM

## 2024-05-14 DIAGNOSIS — E11.65 TYPE 2 DIABETES MELLITUS WITH HYPERGLYCEMIA, WITHOUT LONG-TERM CURRENT USE OF INSULIN (HCC): Primary | ICD-10-CM

## 2024-05-14 DIAGNOSIS — R79.89 LOW TESTOSTERONE: ICD-10-CM

## 2024-05-14 LAB
CREAT UR-SCNC: 56 MG/DL
GLUCOSE BLOOD: 109
HEMOGLOBIN A1C: 6.8 % (ref 4.3–5.6)
MICROALBUMIN UR-MCNC: 0.83 MG/DL
MICROALBUMIN/CREAT 24H UR-RTO: 14.8 UG/MG (ref ?–30)
TEST STRIP LOT #: NORMAL NUMERIC

## 2024-05-14 PROCEDURE — 99215 OFFICE O/P EST HI 40 MIN: CPT | Performed by: NURSE PRACTITIONER

## 2024-05-14 PROCEDURE — 3078F DIAST BP <80 MM HG: CPT | Performed by: NURSE PRACTITIONER

## 2024-05-14 PROCEDURE — 82043 UR ALBUMIN QUANTITATIVE: CPT | Performed by: NURSE PRACTITIONER

## 2024-05-14 PROCEDURE — 82947 ASSAY GLUCOSE BLOOD QUANT: CPT | Performed by: NURSE PRACTITIONER

## 2024-05-14 PROCEDURE — 3074F SYST BP LT 130 MM HG: CPT | Performed by: NURSE PRACTITIONER

## 2024-05-14 PROCEDURE — 83036 HEMOGLOBIN GLYCOSYLATED A1C: CPT | Performed by: NURSE PRACTITIONER

## 2024-05-14 PROCEDURE — 82570 ASSAY OF URINE CREATININE: CPT | Performed by: NURSE PRACTITIONER

## 2024-05-14 RX ORDER — GABAPENTIN 300 MG/1
300 CAPSULE ORAL 2 TIMES DAILY
COMMUNITY
Start: 2024-01-24

## 2024-05-14 RX ORDER — SEMAGLUTIDE 0.68 MG/ML
INJECTION, SOLUTION SUBCUTANEOUS
Qty: 3 ML | Refills: 0 | COMMUNITY
Start: 2024-05-14

## 2024-05-14 RX ORDER — EMPAGLIFLOZIN 10 MG/1
10 TABLET, FILM COATED ORAL DAILY
COMMUNITY
End: 2024-05-14 | Stop reason: ALTCHOICE

## 2024-05-14 NOTE — TELEPHONE ENCOUNTER
Phone call from Marge at [facility]- the patient has not had a diabetic eye exam with their office yet, he is scheduled for an appointment in June, so they can send the exam after that appointment.

## 2024-05-14 NOTE — TELEPHONE ENCOUNTER
Contacted PCP office for recent labs for today's appointment with Zulma.  No recent labs per PCP office.

## 2024-05-14 NOTE — PATIENT INSTRUCTIONS
I really would recommend Ozempic over the Jardiance ~ It works in more pathways compared to Jardiance     Please upload the recent labs you had with your employer - through my chart.         Start taking Ozempic ONCE a week.   Dose: 0.25mg once weekly x 4 doses (4 weeks)   If you do not have any GI symptoms (bloating, diarrhea, nausea) increase the dose to 0.5mg once a week.     The sample pen that was provided for you will last you 6 weeks before you have to  the medicine supply at the pharmacy.     Rx sent to to pharmacy - [facility] to see if lower cost.   Ozempic 1mg at your [facility] but let us know if you find a lower cost   You can dose 0.5mg on a 1mg Ozempic pen - to allow the supply to last longer     Count 36 clicks from the marking past zero on pen; arcenio this line so that you have reference for future doses   This pen will last 2months   Store the pen between doses in refrigerator       If you want the 2mg Ozempic pen at next refill time, please contact me. The 2mg  pen will last up to 4 months but we can wait to rx this- since pharmacy may question the higher dose - after being off for 1 year           There is an ozempic co pay card. It now offers $125 off with your high deductible plan       Text BEGIN to [phone] - Or you can visit: [employer]     The prescription will also be sent to your pharmacy but do not  the medication until at least 4 weeks on the medication to be sure you are tolerating this medicine.     For your testosterone , make appointment with endocrine:       [facility]:   Providers:       Carmen Suero DO      [address]    [phone]       Fluctuations in blood sugars are best detected by testing your sugar with your meter.   In order for me to determine any patterns in your blood sugars, you will need to test your blood sugar 1- 2 times  weekly         American Diabetes Association: blood sugar  targets:     Fasting blood sugar (before breakfast) [employer]:    (ideally less than 110)  2 hours after eating less than 180 (ideally less than  150 )     Call for blood sugars more  than 200 more than 2 times in a week

## 2024-12-19 ENCOUNTER — TELEPHONE (OUTPATIENT)
Age: 49
End: 2024-12-19

## 2024-12-19 ENCOUNTER — OFFICE VISIT (OUTPATIENT)
Age: 49
End: 2024-12-19
Payer: COMMERCIAL

## 2024-12-19 ENCOUNTER — TELEPHONE (OUTPATIENT)
Dept: ENDOCRINOLOGY CLINIC | Facility: CLINIC | Age: 49
End: 2024-12-19

## 2024-12-19 VITALS
SYSTOLIC BLOOD PRESSURE: 124 MMHG | WEIGHT: 211.19 LBS | DIASTOLIC BLOOD PRESSURE: 62 MMHG | HEART RATE: 66 BPM | RESPIRATION RATE: 18 BRPM | BODY MASS INDEX: 29 KG/M2

## 2024-12-19 DIAGNOSIS — E11.65 TYPE 2 DIABETES MELLITUS WITH HYPERGLYCEMIA, WITHOUT LONG-TERM CURRENT USE OF INSULIN (HCC): Primary | ICD-10-CM

## 2024-12-19 LAB
GLUCOSE BLOOD: 202
HEMOGLOBIN A1C: 8.5 % (ref 4.3–5.6)

## 2024-12-19 PROCEDURE — 3078F DIAST BP <80 MM HG: CPT

## 2024-12-19 PROCEDURE — 99214 OFFICE O/P EST MOD 30 MIN: CPT

## 2024-12-19 PROCEDURE — 83036 HEMOGLOBIN GLYCOSYLATED A1C: CPT

## 2024-12-19 PROCEDURE — 3074F SYST BP LT 130 MM HG: CPT

## 2024-12-19 PROCEDURE — 82947 ASSAY GLUCOSE BLOOD QUANT: CPT

## 2024-12-19 RX ORDER — SEMAGLUTIDE 0.68 MG/ML
0.25 INJECTION, SOLUTION SUBCUTANEOUS WEEKLY
Qty: 1 EACH | Refills: 12 | COMMUNITY
Start: 2024-12-19

## 2024-12-19 NOTE — PROGRESS NOTES
Chief Complaint   Patient presents with    Diabetes     Follow up- Meter     Hernandez Roth is a 49 year old presenting for diabetes management.   Primary care physician: Abdoul Turner   Followed-up by PHILLIP Solitario. Last visit: 05/2024     Today's A1C 8.5% ( last A1C 6.8 %)   In-office PO preprandial 202 mg/dl    Seen by Zulma on and off since 2020. On a Medtronic pump in 2020. Stopped pump in Oct 2021 due to better trends and went back to PCP for further diabetes management.   Came back to see Zulma from last appointment only on Jardiance with A1c of 6.8%. Off Ozempic for a year d/t cost.  Since last appointment, Ozempic restarted. Able to maintained med for 3 months. Off Ozempic again for almost 2 months now.     Does not routinely test BG trends   Checks once a month  Range 120 -140 mg/dl  Highest BG - 200 mg/dl    With job, he is on high deductible insurance with HSA that is funded with by employer.   Insurance advised to get a 3-month supply for less cost.    Seeing urology  Dr Raffi Youssef - but will not rx testosterone:referred to endo but could not get appt for several months    Has history of kidney stones - had several procedures for it; passed 7 stones so far.    Has annual labs done w employer; pt has results at home; will send in via my chart  - still not sent to [facility]. Reminded pt to send us a copy    Diabetes History:   DM 2-2014   Patient has had hospitalizations for blood sugar issues (hypolycemia at insulin initiation)   denies any history of pancreatitis      Previous DM therapies:  Metformin ER, IR: severe GI   Glimepiride: change in therapy    Medtronic 670 G 6-2021 : transitioned off pump due to improved trends  Faxiga: formulary 2021   Jardiance - cost and change of therapy 05/2024    Current DM Regimen:  Ozempic 0.5mg subcutaneous once weekly (  off for 2 months due to cost)       HGBA1C:    Lab Results   Component Value Date    A1C 8.5 (A) 12/19/2024    A1C 6.8 (A) 05/14/2024     A1C 5.6 10/06/2021     (H) 02/04/2018         Lab Results   Component Value Date    CHOLEST 137 10/06/2021    CHOLEST 122 06/05/2021    TRIG 145 10/06/2021    TRIG 103 06/05/2021    HDL 33 06/05/2021    HDL 21 (L) 03/06/2018    LDL  03/06/2018      Comment:      Unable to calculate LDL when TGL >400      LDL  07/13/2016      Comment:      Unable to calculate LDL when TGL > 400.       Lab Results   Component Value Date    MICROALBCREA 14.8 05/14/2024    MICROALBCREA 14.1 03/06/2018      Lab Results   Component Value Date    CREATSERUM 1.09 09/21/2023    CREATSERUM 1.15 10/06/2021    EGFRCR 84 09/21/2023     Lab Results   Component Value Date    AST 13 (L) 09/21/2023    AST 16 10/06/2021    ALT 30 09/21/2023    ALT 13 10/06/2021       Lab Results   Component Value Date    TSH 1.333 03/06/2018    TSH 1.570 01/24/2015       Component      Latest Ref Rng & Units 10/1/2015   C-PEPTIDE, SERUM      1.1 - 4.4 ng/mL 7.9 (H)   ANTIPANCREATIC ISLET CELLS      Neg:<1:1 Negative       JAYNA-65      0.0 - 1.5 U/mL <1.0       DM Complications:  Microvascular:   Neuropathy: no  Retinopathy: no  Nephropathy: no    Macrovascular:  PVD: no  CAD: no  Stroke/CVA: no    Modifying factors:  Medication adherence: yes  Barriers: Cost of rx   Recent steroids, illness or infections: ( past 90 d) no     Allergies: Mold, Pollen, and Metformin    Past Medical History:    Hyperlipidemia LDL goal < 100    Hypertriglyceridemia    TG levels range from 400-700s    Incomplete RBBB    Dx in 2013    Nephrolithiasis    Dx in 2014 - had cysto    Obesity (BMI 30-39.9)    BMI 30    MELVI on CPAP    Type II or unspecified type diabetes mellitus without mention of complication, uncontrolled    Dx at age 37 in 2/2013    Visual impairment    Blurry vision     Past Surgical History:   Procedure Laterality Date    Colonoscopy  2002    negative    Other surgical history      DST repair    Other surgical history  3/6/14    Cysto stent removal- Dr. Linares     Other surgical history  14    Cysto, L URS, L stent- Dr. Linares    Other surgical history      Cysto,Rt RPG,Rt URS, Stent, Dilation    Other surgical history  2018    Cysto/Stent Removal- Dr. Hernandez     Tonsillectomy       Social History     Socioeconomic History    Marital status:     Number of children: 2   Occupational History    Occupation: High school guidance counselor     Employer: [employer] [address] [place] [address]   Tobacco Use    Smoking status: Former     Current packs/day: 0.00     Average packs/day: 1 pack/day for 5.0 years (5.0 ttl pk-yrs)     Types: Cigarettes     Start date: 1994     Quit date: 1999     Years since quittin.0    Smokeless tobacco: Never   Vaping Use    Vaping status: Never Used   Substance and Sexual Activity    Alcohol use: Yes     Comment: 2 wine and 1 beer a week    Drug use: No    Sexual activity: Yes     Partners: Female     Social Drivers of Health      [facility]    Housing Stability     Family History   Problem Relation Age of Onset    Cancer Mother         polpys before age 30    Cancer Maternal Grandfather         colon cancer    Cancer Paternal Grandfather         colon cancer    Diabetes Father         Type 2 DM     Current Medication List:   Current Outpatient Medications   Medication Sig Dispense Refill    semaglutide 4 MG/3ML Subcutaneous Solution Pen-injector 1mg weekly as directed 9 mL 0    semaglutide (OZEMPIC, 0.25 OR 0.5 MG/DOSE,) 2 MG/3ML Subcutaneous Solution Pen-injector Inject 0.25 mg into the skin once a week. 1 each 12    TESTOSTERONE IM Inject 200 mg/mL into the muscle.      PEG 3350-KCl-Na Bicarb-NaCl 420 g Oral Recon Soln Take as directed by physician. 4000 mL 0    ergocalciferol 1.25 MG (80852 UT) Oral Cap Take 1 capsule (50,000 Units total) by mouth twice a week.      Fenofibrate 145 MG Oral Tab TK 1 T PO QD  1    Rosuvastatin Calcium 10 MG Oral Tab Take 1 tablet (10 mg total) by mouth  nightly.      semaglutide (OZEMPIC, 0.25 OR 0.5 MG/DOSE,) 2 MG/3ML Subcutaneous Solution Pen-injector As directed (Patient not taking: Reported on 12/19/2024) 3 mL 0       DM associated review of  symptoms:   Endocrine: Polyuria, polyphagia, polydipsia: no  Neurological: Paresthesias: yes LE   HEENT: Blurred vision: no  Skin: no rash or wounds  Hematological: Hypoglycemia: no    Review of Systems     LUNGS: denies shortness of breath   CARDIOVASCULAR: denies chest pain  GI: denies abdominal pain, nausea or diarrhea   : denies dysuria  MUSCULOSKELETAL: denies pain    Vitals:    12/19/24 0719   BP: 124/62   Pulse: 66   Resp: 18   Weight: 211 lb 3.2 oz (95.8 kg)        Physical Exam   Vitals reviewed.  Constitutional: Normal appearance   Cardiovascular: Normal rate , rhythm   Pulmonary/Chest: Effort normal  Neurological: Alert and oriented .   Psychiatric: Normal mood and affect.     Feet: Normal monofilament exam, 2+ DP, PT pulses, mild onchomycosis, no skin breakage   Diabetes foot exam:   Good foot hygiene. Mild thickening of nails. No skin breakage  Bilateral barefoot skin diabetic exam: normal.  Visualized feet and the appearance : normal.  Bilateral monofilament/sensation of both feet is normal. Vibration to dorsum to the first toe perceived.   Bilateral 2+ pedal pulse exam      Assessment/Plan:  Low testosterone  [facility] endo provider contact on AVS    Dyslipidemia:  Last labs this year 2024 - done at work. Pt will send via [employer]  Continue Rosuvastatin/fibrate rx.       Type 2 diabetes with hypoglycemia   A1C 8.5% ( last A1c  6.8%)   Weight: 211 lb ( last weight 203 lb ) - gained 8 lbs since last visit  Wt Readings from Last 3 Encounters:   12/19/24 211 lb 3.2 oz (95.8 kg)   05/14/24 203 lb (92.1 kg)   09/21/23 215 lb (97.5 kg)       Diabetes control is increased. Needs ongoing management and evaluation given the chronicity of Diabetes, ongoing medication monitoring and risk for complications      ~NO  Metformin due to past hx GI issues  (XR/IR )   Discussed benefit of GLP1 for T2DM and how it impacts 6 of the 8 defects w T2DM v SGLT2 targets only kidney excretion of BG     Restart: Ozempic 0.25mg subcutaneous once weekly x 4 weeks, then increase to 0.5mg Ozempic -> given sample    Will use 1mg Ozempic pen for 0.5mg dosing (36 clicks) to help with OOP cost   Continue site roation for weekly injections    -Uncontrolled  -Discussed importance of glycemic control to prevent complications of diabetes  -Discussed complications of diabetes include retinopathy, neuropathy, nephropathy and cardiovascular disease  -Discussed ABCs of DM (A1c, blood pressure and cholesterol)  -Discussed importance of SBGM  -Discussed importance of low CHO diet, recommend 45gm per meal or 135gm per day    Reviewed clinical signifiance of A1c, adverse effects of suboptimal glucose control, and goals of therapy   Reminded pt on A1C and blood sugar targets (Fasting < 130 and post prandial <180 ) and complications associated with hyperglycemia and uncontrolled DM (on AVS)   Recommended SMBG 2- x weekly  Continue with lifestyle modifications since they have positive impact on diabetes/blood sugars/health (portion control, physical activity, weight loss)     The patient is asked to return in 1 m but recommended to contact DM clinic sooner if questions or concerns.    Advised to reach out if unable to obtain Ozempic via prescription due to cost barrier. We might have to switch to a less expensive meds for better glucose control.    The patient indicates understanding of these issues and agrees to the plan.    Orders Placed This Encounter    POC Hemoglobin A1C     Order Specific Question:   Release to patient     Answer:   Immediate    HemoCue Glucose 201 (Glucose blood test)     Order Specific Question:   Release to patient     Answer:   Immediate    semaglutide 4 MG/3ML Subcutaneous Solution Pen-injector     Simg weekly as directed      Dispense:  9 mL     Refill:  0    semaglutide (OZEMPIC, 0.25 OR 0.5 MG/DOSE,) 2 MG/3ML Subcutaneous Solution Pen-injector     Sig: Inject 0.25 mg into the skin once a week.     Dispense:  1 each     Refill:  12     LOT:[id number]  Exp:02/28/2026    Sample:1     Order Specific Question:   Lot Number?     Answer:   [id number]     Order Specific Question:   Expiration Date?     Answer:   2/28/2026     Order Specific Question:   Quantity     Answer:   3     Comments:   mL     DM quality  A1C/Blood pressure: as reported above   Nephropathy screening: NEG 5 -2021 No indication for ace /arb rx.   LIPID screening:  ; rosuvastatin rx/fibrate .   Last dilated eye exam: [facility] 4/2024 Exam shows retinopathy? No data recorded we will follow-u[  Last diabetic foot exam: 12/19/24    Defer foot exam to follow up appointment   Call [facility] for DM eye exam: (4-2024)     instructed patient on GLP rx  pen use, dosing w with the GLP rx pen, pen needles, site selection for subcutaneous injections, rotation and subcutaneous  injection administration.  Ozempic 0.25mg  dose was self-administered today in clinic and demonstrated good technique and understanding of above content.     Spent 40 min obtaining patient history, evaluating patient, reviewing blood glucose trends, discussing treatment options, lifestyle modifications and completing documentation -  The risks and benefits of my recommendations, as well as other treatment options were discussed with the patient today. questions were also answered to the best of my knowledge.

## 2024-12-19 NOTE — TELEPHONE ENCOUNTER
Received fax from [employer] that ozempic 1mg is needing a PA  Initiated on CMM, key: [id number]    Your information has been submitted to [employer]. [employer] is reviewing the PA request and you will receive an electronic response. You may check for the updated outcome later by reopening this request. The standard fax determination will also be sent to you directly.    If you have any questions about your PA submission, contact [employer] at [phone].

## 2024-12-20 NOTE — TELEPHONE ENCOUNTER
Patient called office- he had seen that the Ozempic prescription was sent in by PHILLIP Chappell after appointment yesterday- advised patient that we had to submit a Prior Authorization -which could be why he can no longer see it- patient asked if he would need to do anything else on his end. Advised no, just really need to wait the few days for insurance to respond. Patient advised he has already met his deductible, so his medication should be covered 100% - he was going to try reaching out to a contact he works with at the insurance to see if they can maybe look into it and make sure it gets approved before 1/1/25.    No further questions/concerns at this time.

## 2024-12-23 NOTE — TELEPHONE ENCOUNTER
Received fax from [employer] that PA was approved for ozempic 1mg from 11/20/24 through 12/20/25. Sent letter to scan and sent pt mcm

## 2025-01-09 NOTE — TELEPHONE ENCOUNTER
Recalled [facility] for eye exam [phone] for eye exam to be faxed over. Confirmed office number and fax number for report if not able to send they will contact office.

## 2025-01-21 NOTE — TELEPHONE ENCOUNTER
Called [facility] again - pt no showed for last diabetic eye exam. None on file. They will reach out to pt to try to reschedule, will give us a call back if able to schedule pt

## 2025-01-21 NOTE — TELEPHONE ENCOUNTER
[facility] called back stating they did call pt to try and get r/s but still no answer and there is nothing they can do now. Sending MCM to patient to get eye exam done for 2025

## 2025-07-10 LAB
AMB EXT ANION GAP: 8
AMB EXT BILIRUBIN, TOTAL: 1.1 MG/DL
AMB EXT BUN: 14 MG/DL
AMB EXT CALCIUM: 9.2
AMB EXT CARBON DIOXIDE: 27
AMB EXT CHLORIDE: 100
AMB EXT CHOL/HDL RATIO: 6.9
AMB EXT CHOLESTEROL, TOTAL: 227 MG/DL
AMB EXT CMP ALT: 21 U/L
AMB EXT CMP AST: 15 U/L
AMB EXT CREATININE: 0.96 MG/DL
AMB EXT EGFR NON-AA: 90
AMB EXT GLUCOSE: 172 MG/DL
AMB EXT HDL CHOLESTEROL: 33 MG/DL
AMB EXT HGBA1C: 7.1 %
AMB EXT MALB URINE CALC: 1 MG/24HR
AMB EXT NON HDL CHOL: 194 MG/DL
AMB EXT POSTASSIUM: 4.3 MMOL/L
AMB EXT SODIUM: 135 MMOL/L
AMB EXT TOTAL PROTEIN: 6.8
AMB EXT TRIGLYCERIDES: 772 MG/DL
AMB EXT TSH: 1.69 MIU/ML

## 2025-07-14 ENCOUNTER — TELEPHONE (OUTPATIENT)
Facility: CLINIC | Age: 50
End: 2025-07-14

## 2025-07-14 LAB
T4, FREE: 0.85
VITAMIN B12: 204

## (undated) DEVICE — 273 SINGLE USE LASER FIBER

## (undated) DEVICE — KENDALL SCD EXPRESS SLEEVES, KNEE LENGTH, MEDIUM: Brand: KENDALL SCD

## (undated) DEVICE — Device

## (undated) DEVICE — CYSTO CDS-LF: Brand: MEDLINE INDUSTRIES, INC.

## (undated) DEVICE — SEAL BIOPSY PORT ACMI

## (undated) DEVICE — SOL H2O 3000ML IRRIG

## (undated) DEVICE — STERILE POLYISOPRENE POWDER-FREE SURGICAL GLOVES: Brand: PROTEXIS

## (undated) DEVICE — PROXIS URETERAL ACCESS SHEATH

## (undated) DEVICE — TAPE UMBILICAL U11T

## (undated) DEVICE — SEAL Y BIOPSY PORT P6R SCOPE

## (undated) DEVICE — NITINOL WIRE STR 035

## (undated) DEVICE — 3M™ TEGADERM™ TRANSPARENT FILM DRESSING, 1626W, 4 IN X 4-3/4 IN (10 CM X 12 CM), 50 EACH/CARTON, 4 CARTON/CASE: Brand: 3M™ TEGADERM™

## (undated) DEVICE — BAG DRAIN INFECTION CNTRL 2000

## (undated) DEVICE — OPEN-END FLEXI-TIP URETERAL CATHETER: Brand: FLEXI-TIP

## (undated) NOTE — LETTER
[facility]  Tash Tanishanolberto [address] [facility], [address] [place] [address]    Consent for Operation    Date: __________________    Time: _______________    1.  I authorize the performance upon Mary Anne Zamarripa the following operation:    Procedure(s):  Cystoscopy, Right ureter procedure has been videotaped, the surgeon will obtain the original videotape. The hospital will not be responsible for storage or maintenance of this tape.     6. For the purpose of advancing medical education, I consent to the admittance of observers to t STATEMENTS REQUIRING INSERTION OR COMPLETION WERE FILLED IN.     Signature of Patient:   ___________________________    When the patient is a minor or mentally incompetent to give consent:  Signature of person authorized to consent for patient: ____________ drugs/illegal medications). Failure to inform my anesthesiologist about these medicines may increase my risk of anesthetic complications. · If I am allergic to anything or have had a reaction to anesthesia before.     3. I understand how the anesthesia med I have discussed the procedure and information above with the patient (or patient’s representative) and answered their questions. The patient or their representative has agreed to have anesthesia services.     _______________________________________________

## (undated) NOTE — ED AVS SNAPSHOT
Velasquez Molina   MRN: [id number]    Department:  [facility] Emergency Department   Date of Visit:  3/6/2019           Disclosure     Insurance plans vary and the physician(s) referred by the ER may not be covered by your plan.  Please contact your ins tell this physician (or your personal doctor if your instructions are to return to your personal doctor) about any new or lasting problems. The primary care or specialist physician will see patients referred from the [facility] Emergency Department.  Anthony Rey

## (undated) NOTE — LETTER
08/20/20         [facility] [place] [address]  11/17/1975    To whom it may concern,     The above patient is under my care for diabetes.  My patient is making a request for reasonable accommodations at work due to the COVID-19